# Patient Record
Sex: FEMALE | Race: WHITE | NOT HISPANIC OR LATINO | Employment: UNEMPLOYED | URBAN - METROPOLITAN AREA
[De-identification: names, ages, dates, MRNs, and addresses within clinical notes are randomized per-mention and may not be internally consistent; named-entity substitution may affect disease eponyms.]

---

## 2024-10-28 ENCOUNTER — APPOINTMENT (OUTPATIENT)
Dept: RADIOLOGY | Facility: HOSPITAL | Age: 69
DRG: 083 | End: 2024-10-28
Payer: MEDICARE

## 2024-10-28 ENCOUNTER — APPOINTMENT (OUTPATIENT)
Dept: RADIOLOGY | Facility: HOSPITAL | Age: 69
End: 2024-10-28
Payer: MEDICARE

## 2024-10-28 ENCOUNTER — HOSPITAL ENCOUNTER (INPATIENT)
Facility: HOSPITAL | Age: 69
DRG: 083 | End: 2024-10-28
Attending: EMERGENCY MEDICINE | Admitting: SURGERY
Payer: MEDICARE

## 2024-10-28 ENCOUNTER — HOSPITAL ENCOUNTER (EMERGENCY)
Facility: HOSPITAL | Age: 69
Discharge: OTHER INSTITUTION | End: 2024-10-28
Attending: STUDENT IN AN ORGANIZED HEALTH CARE EDUCATION/TRAINING PROGRAM
Payer: MEDICARE

## 2024-10-28 ENCOUNTER — CLINICAL SUPPORT (OUTPATIENT)
Dept: EMERGENCY MEDICINE | Facility: HOSPITAL | Age: 69
DRG: 083 | End: 2024-10-28
Payer: MEDICARE

## 2024-10-28 VITALS
SYSTOLIC BLOOD PRESSURE: 162 MMHG | HEIGHT: 66 IN | OXYGEN SATURATION: 97 % | WEIGHT: 160 LBS | RESPIRATION RATE: 17 BRPM | DIASTOLIC BLOOD PRESSURE: 80 MMHG | TEMPERATURE: 97.7 F | HEART RATE: 73 BPM | BODY MASS INDEX: 25.71 KG/M2

## 2024-10-28 DIAGNOSIS — S06.6X1A TRAUMATIC SUBARACHNOID HEMORRHAGE WITH LOSS OF CONSCIOUSNESS OF 30 MINUTES OR LESS, INITIAL ENCOUNTER (MULTI): ICD-10-CM

## 2024-10-28 DIAGNOSIS — F10.929 ALCOHOLIC INTOXICATION WITH COMPLICATION (CMS-HCC): ICD-10-CM

## 2024-10-28 DIAGNOSIS — R09.02 HYPOXIA: ICD-10-CM
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
## 2024-10-28 DIAGNOSIS — R45.1 AGITATION: ICD-10-CM

## 2024-10-28 DIAGNOSIS — S09.90XA CLOSED HEAD INJURY, INITIAL ENCOUNTER: ICD-10-CM

## 2024-10-28 DIAGNOSIS — S06.5XAA SDH (SUBDURAL HEMATOMA) (MULTI): ICD-10-CM

## 2024-10-28 DIAGNOSIS — I60.9 SAH (SUBARACHNOID HEMORRHAGE) (MULTI): Primary | ICD-10-CM

## 2024-10-28 LAB
ABO GROUP (TYPE) IN BLOOD: NORMAL
ALBUMIN SERPL BCP-MCNC: 4.2 G/DL (ref 3.4–5)
ALBUMIN SERPL BCP-MCNC: 4.4 G/DL (ref 3.4–5)
ALP SERPL-CCNC: 56 U/L (ref 33–136)
ALP SERPL-CCNC: 60 U/L (ref 33–136)
ALT SERPL W P-5'-P-CCNC: 24 U/L (ref 7–45)
ALT SERPL W P-5'-P-CCNC: 26 U/L (ref 7–45)
ANION GAP BLDV CALCULATED.4IONS-SCNC: 13 MMOL/L (ref 10–25)
ANION GAP BLDV CALCULATED.4IONS-SCNC: 15 MMOL/L (ref 10–25)
ANION GAP SERPL CALC-SCNC: 16 MMOL/L (ref 10–20)
ANION GAP SERPL CALCULATED.3IONS-SCNC: 14 MMOL/L (ref 10–20)
ANTIBODY SCREEN: NORMAL
AST SERPL W P-5'-P-CCNC: 36 U/L (ref 9–39)
AST SERPL W P-5'-P-CCNC: 36 U/L (ref 9–39)
BASE EXCESS BLDV CALC-SCNC: -5.3 MMOL/L (ref -2–3)
BASE EXCESS BLDV CALC-SCNC: -6.3 MMOL/L (ref -2–3)
BASOPHILS # BLD AUTO: 0.07 X10*3/UL (ref 0–0.1)
BASOPHILS # BLD AUTO: 0.08 X10*3/UL (ref 0–0.1)
BASOPHILS NFR BLD AUTO: 0.5 %
BASOPHILS NFR BLD AUTO: 0.8 %
BILIRUB SERPL-MCNC: 0.4 MG/DL (ref 0–1.2)
BILIRUB SERPL-MCNC: 0.4 MG/DL (ref 0–1.2)
BODY TEMPERATURE: 37 DEGREES CELSIUS
BODY TEMPERATURE: 37 DEGREES CELSIUS
BUN SERPL-MCNC: 13 MG/DL (ref 6–23)
BUN SERPL-MCNC: 14 MG/DL (ref 6–23)
CA-I BLDV-SCNC: 1.06 MMOL/L (ref 1.1–1.33)
CA-I BLDV-SCNC: 1.1 MMOL/L (ref 1.1–1.33)
CALCIUM SERPL-MCNC: 8.2 MG/DL (ref 8.6–10.6)
CALCIUM SERPL-MCNC: 8.8 MG/DL (ref 8.6–10.3)
CHLORIDE BLDV-SCNC: 103 MMOL/L (ref 98–107)
CHLORIDE BLDV-SCNC: 99 MMOL/L (ref 98–107)
CHLORIDE SERPL-SCNC: 102 MMOL/L (ref 98–107)
CHLORIDE SERPL-SCNC: 99 MMOL/L (ref 98–107)
CO2 SERPL-SCNC: 20 MMOL/L (ref 21–32)
CO2 SERPL-SCNC: 20 MMOL/L (ref 21–32)
CREAT SERPL-MCNC: 0.65 MG/DL (ref 0.5–1.05)
CREAT SERPL-MCNC: 0.79 MG/DL (ref 0.5–1.05)
EGFRCR SERPLBLD CKD-EPI 2021: 80 ML/MIN/1.73M*2
EGFRCR SERPLBLD CKD-EPI 2021: >90 ML/MIN/1.73M*2
EOSINOPHIL # BLD AUTO: 0.09 X10*3/UL (ref 0–0.4)
EOSINOPHIL # BLD AUTO: 0.12 X10*3/UL (ref 0–0.4)
EOSINOPHIL NFR BLD AUTO: 0.7 %
EOSINOPHIL NFR BLD AUTO: 1.3 %
ERYTHROCYTE [DISTWIDTH] IN BLOOD BY AUTOMATED COUNT: 12.4 % (ref 11.5–14.5)
ERYTHROCYTE [DISTWIDTH] IN BLOOD BY AUTOMATED COUNT: 12.7 % (ref 11.5–14.5)
ETHANOL SERPL-MCNC: 236 MG/DL
GLUCOSE BLDV-MCNC: 156 MG/DL (ref 74–99)
GLUCOSE BLDV-MCNC: 157 MG/DL (ref 74–99)
GLUCOSE SERPL-MCNC: 160 MG/DL (ref 74–99)
GLUCOSE SERPL-MCNC: 163 MG/DL (ref 74–99)
HCO3 BLDV-SCNC: 21 MMOL/L (ref 22–26)
HCO3 BLDV-SCNC: 22 MMOL/L (ref 22–26)
HCT VFR BLD AUTO: 35.3 % (ref 36–46)
HCT VFR BLD AUTO: 39.1 % (ref 36–46)
HCT VFR BLD EST: 36 % (ref 36–46)
HCT VFR BLD EST: 38 % (ref 36–46)
HGB BLD-MCNC: 11.9 G/DL (ref 12–16)
HGB BLD-MCNC: 12.6 G/DL (ref 12–16)
HGB BLDV-MCNC: 12.1 G/DL (ref 12–16)
HGB BLDV-MCNC: 12.7 G/DL (ref 12–16)
IMM GRANULOCYTES # BLD AUTO: 0.07 X10*3/UL (ref 0–0.5)
IMM GRANULOCYTES # BLD AUTO: 0.13 X10*3/UL (ref 0–0.5)
IMM GRANULOCYTES NFR BLD AUTO: 0.7 % (ref 0–0.9)
IMM GRANULOCYTES NFR BLD AUTO: 1 % (ref 0–0.9)
INHALED O2 CONCENTRATION: 28 %
INR PPP: 0.9 (ref 0.9–1.1)
INR PPP: 1 (ref 0.9–1.2)
LACTATE BLDV-SCNC: 2.1 MMOL/L (ref 0.4–2)
LACTATE BLDV-SCNC: 2.3 MMOL/L (ref 0.4–2)
LACTATE SERPL-SCNC: 2.2 MMOL/L (ref 0.4–2)
LACTATE SERPL-SCNC: 2.3 MMOL/L (ref 0.4–2)
LYMPHOCYTES # BLD AUTO: 2.42 X10*3/UL (ref 0.8–3)
LYMPHOCYTES # BLD AUTO: 4.32 X10*3/UL (ref 0.8–3)
LYMPHOCYTES NFR BLD AUTO: 18.5 %
LYMPHOCYTES NFR BLD AUTO: 45 %
MCH RBC QN AUTO: 28 PG (ref 26–34)
MCH RBC QN AUTO: 28.1 PG (ref 26–34)
MCHC RBC AUTO-ENTMCNC: 32.2 G/DL (ref 32–36)
MCHC RBC AUTO-ENTMCNC: 33.7 G/DL (ref 32–36)
MCV RBC AUTO: 83 FL (ref 80–100)
MCV RBC AUTO: 87 FL (ref 80–100)
MONOCYTES # BLD AUTO: 0.37 X10*3/UL (ref 0.05–0.8)
MONOCYTES # BLD AUTO: 0.46 X10*3/UL (ref 0.05–0.8)
MONOCYTES NFR BLD AUTO: 2.8 %
MONOCYTES NFR BLD AUTO: 4.8 %
NEUTROPHILS # BLD AUTO: 10.02 X10*3/UL (ref 1.6–5.5)
NEUTROPHILS # BLD AUTO: 4.55 X10*3/UL (ref 1.6–5.5)
NEUTROPHILS NFR BLD AUTO: 47.4 %
NEUTROPHILS NFR BLD AUTO: 76.5 %
NRBC BLD-RTO: 0 /100 WBCS (ref 0–0)
NRBC BLD-RTO: 0 /100 WBCS (ref 0–0)
OXYHGB MFR BLDV: 84.8 % (ref 45–75)
OXYHGB MFR BLDV: 91.4 % (ref 45–75)
PCO2 BLDV: 48 MM HG (ref 41–51)
PCO2 BLDV: 49 MM HG (ref 41–51)
PH BLDV: 7.25 PH (ref 7.33–7.43)
PH BLDV: 7.26 PH (ref 7.33–7.43)
PLATELET # BLD AUTO: 195 X10*3/UL (ref 150–450)
PLATELET # BLD AUTO: 202 X10*3/UL (ref 150–450)
PO2 BLDV: 58 MM HG (ref 35–45)
PO2 BLDV: 71 MM HG (ref 35–45)
POTASSIUM BLDV-SCNC: 3.3 MMOL/L (ref 3.5–5.3)
POTASSIUM BLDV-SCNC: 3.4 MMOL/L (ref 3.5–5.3)
POTASSIUM SERPL-SCNC: 3.3 MMOL/L (ref 3.5–5.3)
POTASSIUM SERPL-SCNC: 3.4 MMOL/L (ref 3.5–5.3)
PROT SERPL-MCNC: 7.1 G/DL (ref 6.4–8.2)
PROT SERPL-MCNC: 7.1 G/DL (ref 6.4–8.2)
PROTHROMBIN TIME: 10.3 SECONDS (ref 9.3–12.7)
PROTHROMBIN TIME: 10.5 SECONDS (ref 9.8–12.8)
RBC # BLD AUTO: 4.24 X10*6/UL (ref 4–5.2)
RBC # BLD AUTO: 4.5 X10*6/UL (ref 4–5.2)
RH FACTOR (ANTIGEN D): NORMAL
SAO2 % BLDV: 87 % (ref 45–75)
SAO2 % BLDV: 94 % (ref 45–75)
SODIUM BLDV-SCNC: 133 MMOL/L (ref 136–145)
SODIUM BLDV-SCNC: 134 MMOL/L (ref 136–145)
SODIUM SERPL-SCNC: 132 MMOL/L (ref 136–145)
SODIUM SERPL-SCNC: 133 MMOL/L (ref 136–145)
WBC # BLD AUTO: 13.1 X10*3/UL (ref 4.4–11.3)
WBC # BLD AUTO: 9.6 X10*3/UL (ref 4.4–11.3)

## 2024-10-28 PROCEDURE — 99221 1ST HOSP IP/OBS SF/LOW 40: CPT

## 2024-10-28 PROCEDURE — 85025 COMPLETE CBC W/AUTO DIFF WBC: CPT

## 2024-10-28 PROCEDURE — 86901 BLOOD TYPING SEROLOGIC RH(D): CPT

## 2024-10-28 PROCEDURE — 85027 COMPLETE CBC AUTOMATED: CPT

## 2024-10-28 PROCEDURE — 86850 RBC ANTIBODY SCREEN: CPT

## 2024-10-28 PROCEDURE — 80053 COMPREHEN METABOLIC PANEL: CPT

## 2024-10-28 PROCEDURE — 72125 CT NECK SPINE W/O DYE: CPT

## 2024-10-28 PROCEDURE — 96375 TX/PRO/DX INJ NEW DRUG ADDON: CPT

## 2024-10-28 PROCEDURE — 99291 CRITICAL CARE FIRST HOUR: CPT | Mod: 25

## 2024-10-28 PROCEDURE — 82805 BLOOD GASES W/O2 SATURATION: CPT

## 2024-10-28 PROCEDURE — 82077 ASSAY SPEC XCP UR&BREATH IA: CPT

## 2024-10-28 PROCEDURE — 72128 CT CHEST SPINE W/O DYE: CPT | Mod: 52

## 2024-10-28 PROCEDURE — 99221 1ST HOSP IP/OBS SF/LOW 40: CPT | Performed by: NEUROLOGICAL SURGERY

## 2024-10-28 PROCEDURE — 70496 CT ANGIOGRAPHY HEAD: CPT

## 2024-10-28 PROCEDURE — 71045 X-RAY EXAM CHEST 1 VIEW: CPT

## 2024-10-28 PROCEDURE — 84132 ASSAY OF SERUM POTASSIUM: CPT

## 2024-10-28 PROCEDURE — 84132 ASSAY OF SERUM POTASSIUM: CPT | Performed by: EMERGENCY MEDICINE

## 2024-10-28 PROCEDURE — 74177 CT ABD & PELVIS W/CONTRAST: CPT

## 2024-10-28 PROCEDURE — 2500000004 HC RX 250 GENERAL PHARMACY W/ HCPCS (ALT 636 FOR OP/ED)

## 2024-10-28 PROCEDURE — 96365 THER/PROPH/DIAG IV INF INIT: CPT | Mod: 59

## 2024-10-28 PROCEDURE — 70450 CT HEAD/BRAIN W/O DYE: CPT

## 2024-10-28 PROCEDURE — 70450 CT HEAD/BRAIN W/O DYE: CPT | Performed by: RADIOLOGY

## 2024-10-28 PROCEDURE — 83605 ASSAY OF LACTIC ACID: CPT

## 2024-10-28 PROCEDURE — 93005 ELECTROCARDIOGRAM TRACING: CPT

## 2024-10-28 PROCEDURE — 85347 COAGULATION TIME ACTIVATED: CPT

## 2024-10-28 PROCEDURE — 84100 ASSAY OF PHOSPHORUS: CPT

## 2024-10-28 PROCEDURE — 36415 COLL VENOUS BLD VENIPUNCTURE: CPT

## 2024-10-28 PROCEDURE — 2550000001 HC RX 255 CONTRASTS: Performed by: STUDENT IN AN ORGANIZED HEALTH CARE EDUCATION/TRAINING PROGRAM

## 2024-10-28 PROCEDURE — 82810 BLOOD GASES O2 SAT ONLY: CPT

## 2024-10-28 PROCEDURE — 96374 THER/PROPH/DIAG INJ IV PUSH: CPT

## 2024-10-28 PROCEDURE — 99285 EMERGENCY DEPT VISIT HI MDM: CPT

## 2024-10-28 PROCEDURE — 84295 ASSAY OF SERUM SODIUM: CPT

## 2024-10-28 PROCEDURE — 71045 X-RAY EXAM CHEST 1 VIEW: CPT | Performed by: RADIOLOGY

## 2024-10-28 PROCEDURE — 99285 EMERGENCY DEPT VISIT HI MDM: CPT | Mod: 25

## 2024-10-28 PROCEDURE — 36600 WITHDRAWAL OF ARTERIAL BLOOD: CPT

## 2024-10-28 PROCEDURE — 2550000001 HC RX 255 CONTRASTS: Performed by: EMERGENCY MEDICINE

## 2024-10-28 PROCEDURE — 2020000001 HC ICU ROOM DAILY

## 2024-10-28 PROCEDURE — 85610 PROTHROMBIN TIME: CPT

## 2024-10-28 PROCEDURE — 84075 ASSAY ALKALINE PHOSPHATASE: CPT

## 2024-10-28 PROCEDURE — 72170 X-RAY EXAM OF PELVIS: CPT | Performed by: RADIOLOGY

## 2024-10-28 PROCEDURE — G0390 TRAUMA RESPONS W/HOSP CRITI: HCPCS

## 2024-10-28 PROCEDURE — 96375 TX/PRO/DX INJ NEW DRUG ADDON: CPT | Mod: 59

## 2024-10-28 PROCEDURE — 72128 CT CHEST SPINE W/O DYE: CPT | Mod: REDUCED SERVICES | Performed by: RADIOLOGY

## 2024-10-28 PROCEDURE — 71275 CT ANGIOGRAPHY CHEST: CPT

## 2024-10-28 PROCEDURE — 70498 CT ANGIOGRAPHY NECK: CPT | Performed by: RADIOLOGY

## 2024-10-28 PROCEDURE — 70496 CT ANGIOGRAPHY HEAD: CPT | Performed by: RADIOLOGY

## 2024-10-28 PROCEDURE — 72170 X-RAY EXAM OF PELVIS: CPT

## 2024-10-28 PROCEDURE — 83735 ASSAY OF MAGNESIUM: CPT

## 2024-10-28 PROCEDURE — 70498 CT ANGIOGRAPHY NECK: CPT

## 2024-10-28 PROCEDURE — 2500000004 HC RX 250 GENERAL PHARMACY W/ HCPCS (ALT 636 FOR OP/ED): Performed by: SURGERY

## 2024-10-28 PROCEDURE — 85730 THROMBOPLASTIN TIME PARTIAL: CPT

## 2024-10-28 PROCEDURE — 82330 ASSAY OF CALCIUM: CPT | Performed by: EMERGENCY MEDICINE

## 2024-10-28 PROCEDURE — 85384 FIBRINOGEN ACTIVITY: CPT

## 2024-10-28 PROCEDURE — 99285 EMERGENCY DEPT VISIT HI MDM: CPT | Performed by: EMERGENCY MEDICINE

## 2024-10-28 PROCEDURE — 74177 CT ABD & PELVIS W/CONTRAST: CPT | Performed by: RADIOLOGY

## 2024-10-28 PROCEDURE — 71275 CT ANGIOGRAPHY CHEST: CPT | Performed by: RADIOLOGY

## 2024-10-28 PROCEDURE — 36415 COLL VENOUS BLD VENIPUNCTURE: CPT | Performed by: EMERGENCY MEDICINE

## 2024-10-28 RX ORDER — HYDRALAZINE HYDROCHLORIDE 20 MG/ML
10 INJECTION INTRAMUSCULAR; INTRAVENOUS ONCE
Status: COMPLETED | OUTPATIENT
Start: 2024-10-29 | End: 2024-10-29

## 2024-10-28 RX ORDER — LEVETIRACETAM 10 MG/ML
1000 INJECTION INTRAVASCULAR ONCE
Status: COMPLETED | OUTPATIENT
Start: 2024-10-28 | End: 2024-10-28

## 2024-10-28 RX ORDER — DROPERIDOL 2.5 MG/ML
1.25 INJECTION, SOLUTION INTRAMUSCULAR; INTRAVENOUS ONCE
Status: COMPLETED | OUTPATIENT
Start: 2024-10-28 | End: 2024-10-28

## 2024-10-28 RX ORDER — HYDRALAZINE HYDROCHLORIDE 20 MG/ML
10 INJECTION INTRAMUSCULAR; INTRAVENOUS ONCE
Status: COMPLETED | OUTPATIENT
Start: 2024-10-28 | End: 2024-10-28

## 2024-10-28 RX ORDER — ONDANSETRON HYDROCHLORIDE 2 MG/ML
INJECTION, SOLUTION INTRAVENOUS
Status: DISPENSED
Start: 2024-10-28 | End: 2024-10-29

## 2024-10-28 RX ORDER — ONDANSETRON HYDROCHLORIDE 2 MG/ML
4 INJECTION, SOLUTION INTRAVENOUS ONCE
Status: COMPLETED | OUTPATIENT
Start: 2024-10-28 | End: 2024-10-28

## 2024-10-28 RX ORDER — HYDRALAZINE HYDROCHLORIDE 20 MG/ML
INJECTION INTRAMUSCULAR; INTRAVENOUS
Status: COMPLETED
Start: 2024-10-28 | End: 2024-10-28

## 2024-10-28 RX ORDER — HYDRALAZINE HYDROCHLORIDE 20 MG/ML
5 INJECTION INTRAMUSCULAR; INTRAVENOUS EVERY 4 HOURS PRN
Status: DISCONTINUED | OUTPATIENT
Start: 2024-10-28 | End: 2024-10-28

## 2024-10-28 RX ORDER — ONDANSETRON HYDROCHLORIDE 2 MG/ML
INJECTION, SOLUTION INTRAVENOUS CODE/TRAUMA/SEDATION MEDICATION
Status: COMPLETED | OUTPATIENT
Start: 2024-10-28 | End: 2024-10-28

## 2024-10-28 RX ADMIN — HYDRALAZINE HYDROCHLORIDE 10 MG: 20 INJECTION INTRAMUSCULAR; INTRAVENOUS at 22:50

## 2024-10-28 RX ADMIN — IOHEXOL 90 ML: 350 INJECTION, SOLUTION INTRAVENOUS at 23:19

## 2024-10-28 RX ADMIN — HYDRALAZINE HYDROCHLORIDE 5 MG: 20 INJECTION INTRAMUSCULAR; INTRAVENOUS at 23:58

## 2024-10-28 RX ADMIN — DROPERIDOL 1.25 MG: 2.5 INJECTION, SOLUTION INTRAMUSCULAR; INTRAVENOUS at 22:42

## 2024-10-28 RX ADMIN — ONDANSETRON 4 MG: 2 INJECTION, SOLUTION INTRAMUSCULAR; INTRAVENOUS at 22:28

## 2024-10-28 ASSESSMENT — ENCOUNTER SYMPTOMS
WHEEZING: 0
AGITATION: 1
CONFUSION: 1
FLANK PAIN: 1
FEVER: 0
WOUND: 1
CHILLS: 0
SEIZURES: 0
NECK PAIN: 0
VOMITING: 1
EYE DISCHARGE: 0
SPEECH DIFFICULTY: 0
EYE PAIN: 0

## 2024-10-28 ASSESSMENT — LIFESTYLE VARIABLES
TOTAL SCORE: 0
HAVE YOU EVER FELT YOU SHOULD CUT DOWN ON YOUR DRINKING: NO
EVER FELT BAD OR GUILTY ABOUT YOUR DRINKING: NO
EVER HAD A DRINK FIRST THING IN THE MORNING TO STEADY YOUR NERVES TO GET RID OF A HANGOVER: NO
HAVE PEOPLE ANNOYED YOU BY CRITICIZING YOUR DRINKING: NO

## 2024-10-28 ASSESSMENT — PAIN SCALES - GENERAL
PAINLEVEL_OUTOF10: 9
PAINLEVEL_OUTOF10: 9

## 2024-10-28 ASSESSMENT — PAIN SCALES - WONG BAKER: WONGBAKER_NUMERICALRESPONSE: NO HURT

## 2024-10-28 ASSESSMENT — PAIN - FUNCTIONAL ASSESSMENT
PAIN_FUNCTIONAL_ASSESSMENT: 0-10
PAIN_FUNCTIONAL_ASSESSMENT: WONG-BAKER FACES

## 2024-10-29 ENCOUNTER — APPOINTMENT (OUTPATIENT)
Dept: RADIOLOGY | Facility: HOSPITAL | Age: 69
DRG: 083 | End: 2024-10-29
Payer: MEDICARE

## 2024-10-29 LAB
ABO GROUP (TYPE) IN BLOOD: NORMAL
ABO GROUP (TYPE) IN BLOOD: NORMAL
ALBUMIN SERPL BCP-MCNC: 4.4 G/DL (ref 3.4–5)
ALBUMIN SERPL BCP-MCNC: 4.7 G/DL (ref 3.4–5)
ANION GAP SERPL CALC-SCNC: 18 MMOL/L (ref 10–20)
ANION GAP SERPL CALC-SCNC: 18 MMOL/L (ref 10–20)
ANION GAP SERPL CALC-SCNC: 19 MMOL/L (ref 10–20)
ANTIBODY SCREEN: NORMAL
APTT PPP: 27 SECONDS (ref 27–38)
ATRIAL RATE: 90 BPM
BUN SERPL-MCNC: 11 MG/DL (ref 6–23)
BUN SERPL-MCNC: 12 MG/DL (ref 6–23)
BUN SERPL-MCNC: 8 MG/DL (ref 6–23)
CA-I BLD-SCNC: 1.07 MMOL/L (ref 1.1–1.33)
CALCIUM SERPL-MCNC: 8.2 MG/DL (ref 8.6–10.6)
CALCIUM SERPL-MCNC: 8.4 MG/DL (ref 8.6–10.6)
CALCIUM SERPL-MCNC: 8.7 MG/DL (ref 8.6–10.6)
CFT FORM KAOLIN IND BLD RES TEG: 1.5 MIN (ref 0.8–2.1)
CFT FORM KAOLIN IND BLD RES TEG: 1.6 MIN (ref 0.8–2.1)
CHLORIDE SERPL-SCNC: 97 MMOL/L (ref 98–107)
CHLORIDE SERPL-SCNC: 98 MMOL/L (ref 98–107)
CHLORIDE SERPL-SCNC: 99 MMOL/L (ref 98–107)
CLOT ANGLE.KAOLIN INDUCED BLD RES TEG: 70.1 DEG (ref 63–78)
CLOT ANGLE.KAOLIN INDUCED BLD RES TEG: 70.4 DEG (ref 63–78)
CLOT INIT KAO IND P HEP NEUT BLD RES TEG: 3.7 MIN (ref 4.3–8.3)
CLOT INIT KAO IND P HEP NEUT BLD RES TEG: 3.7 MIN (ref 4.6–9.1)
CLOT INIT KAO IND P HEP NEUT BLD RES TEG: 6.1 MIN (ref 4.3–8.3)
CLOT INIT KAO IND P HEP NEUT BLD RES TEG: 6.4 MIN (ref 4.6–9.1)
CO2 SERPL-SCNC: 19 MMOL/L (ref 21–32)
CO2 SERPL-SCNC: 21 MMOL/L (ref 21–32)
CO2 SERPL-SCNC: 22 MMOL/L (ref 21–32)
CREAT SERPL-MCNC: 0.52 MG/DL (ref 0.5–1.05)
CREAT SERPL-MCNC: 0.54 MG/DL (ref 0.5–1.05)
CREAT SERPL-MCNC: 0.63 MG/DL (ref 0.5–1.05)
EGFRCR SERPLBLD CKD-EPI 2021: >90 ML/MIN/1.73M*2
ERYTHROCYTE [DISTWIDTH] IN BLOOD BY AUTOMATED COUNT: 12.3 % (ref 11.5–14.5)
ERYTHROCYTE [DISTWIDTH] IN BLOOD BY AUTOMATED COUNT: 12.6 % (ref 11.5–14.5)
ETHANOL SERPL-MCNC: 174 MG/DL
FIBRINOGEN BLD CALC-MCNC: 266 MG/DL (ref 278–581)
FIBRINOGEN BLD CALC-MCNC: 296 MG/DL (ref 278–581)
FIBRINOGEN PPP-MCNC: 266 MG/DL (ref 200–400)
GLUCOSE BLD MANUAL STRIP-MCNC: 134 MG/DL (ref 74–99)
GLUCOSE BLD MANUAL STRIP-MCNC: 138 MG/DL (ref 74–99)
GLUCOSE BLD MANUAL STRIP-MCNC: 145 MG/DL (ref 74–99)
GLUCOSE BLD MANUAL STRIP-MCNC: 158 MG/DL (ref 74–99)
GLUCOSE SERPL-MCNC: 132 MG/DL (ref 74–99)
GLUCOSE SERPL-MCNC: 136 MG/DL (ref 74–99)
GLUCOSE SERPL-MCNC: 159 MG/DL (ref 74–99)
HCT VFR BLD AUTO: 37.2 % (ref 36–46)
HCT VFR BLD AUTO: 39.9 % (ref 36–46)
HGB BLD-MCNC: 12.4 G/DL (ref 12–16)
HGB BLD-MCNC: 12.9 G/DL (ref 12–16)
LACTATE SERPL-SCNC: 1.2 MMOL/L (ref 0.4–2)
LACTATE SERPL-SCNC: 2.3 MMOL/L (ref 0.4–2)
MA KAOLIN BLD RES TEG: 57.9 MM (ref 52–69)
MA KAOLIN BLD RES TEG: 58.5 MM (ref 52–69)
MA KAOLIN+TF BLD RES TEG: 56.9 MM (ref 52–70)
MA KAOLIN+TF BLD RES TEG: 59 MM (ref 52–70)
MA TF IND+IIB-IIIA INH BLD RES TEG: 14.6 MM (ref 15–32)
MA TF IND+IIB-IIIA INH BLD RES TEG: 16.2 MM (ref 15–32)
MAGNESIUM SERPL-MCNC: 1.81 MG/DL (ref 1.6–2.4)
MCH RBC QN AUTO: 27.7 PG (ref 26–34)
MCH RBC QN AUTO: 28.1 PG (ref 26–34)
MCHC RBC AUTO-ENTMCNC: 32.3 G/DL (ref 32–36)
MCHC RBC AUTO-ENTMCNC: 33.3 G/DL (ref 32–36)
MCV RBC AUTO: 84 FL (ref 80–100)
MCV RBC AUTO: 86 FL (ref 80–100)
NRBC BLD-RTO: 0 /100 WBCS (ref 0–0)
NRBC BLD-RTO: 0 /100 WBCS (ref 0–0)
P AXIS: 55 DEGREES
P OFFSET: 161 MS
P ONSET: 110 MS
PHOSPHATE SERPL-MCNC: 3.6 MG/DL (ref 2.5–4.9)
PHOSPHATE SERPL-MCNC: 3.7 MG/DL (ref 2.5–4.9)
PHOSPHATE SERPL-MCNC: 3.9 MG/DL (ref 2.5–4.9)
PLATELET # BLD AUTO: 189 X10*3/UL (ref 150–450)
PLATELET # BLD AUTO: 229 X10*3/UL (ref 150–450)
POTASSIUM SERPL-SCNC: 3.4 MMOL/L (ref 3.5–5.3)
POTASSIUM SERPL-SCNC: 3.6 MMOL/L (ref 3.5–5.3)
POTASSIUM SERPL-SCNC: 4.7 MMOL/L (ref 3.5–5.3)
PR INTERVAL: 200 MS
Q ONSET: 210 MS
QRS COUNT: 15 BEATS
QRS DURATION: 94 MS
QT INTERVAL: 402 MS
QTC CALCULATION(BAZETT): 491 MS
QTC FREDERICIA: 460 MS
R AXIS: 92 DEGREES
RBC # BLD AUTO: 4.42 X10*6/UL (ref 4–5.2)
RBC # BLD AUTO: 4.66 X10*6/UL (ref 4–5.2)
RH FACTOR (ANTIGEN D): NORMAL
RH FACTOR (ANTIGEN D): NORMAL
SODIUM SERPL-SCNC: 131 MMOL/L (ref 136–145)
SODIUM SERPL-SCNC: 134 MMOL/L (ref 136–145)
SODIUM SERPL-SCNC: 134 MMOL/L (ref 136–145)
T AXIS: 29 DEGREES
T OFFSET: 411 MS
VENTRICULAR RATE: 90 BPM
WBC # BLD AUTO: 16.1 X10*3/UL (ref 4.4–11.3)
WBC # BLD AUTO: 16.1 X10*3/UL (ref 4.4–11.3)

## 2024-10-29 PROCEDURE — 36415 COLL VENOUS BLD VENIPUNCTURE: CPT | Performed by: STUDENT IN AN ORGANIZED HEALTH CARE EDUCATION/TRAINING PROGRAM

## 2024-10-29 PROCEDURE — 97166 OT EVAL MOD COMPLEX 45 MIN: CPT | Mod: GO

## 2024-10-29 PROCEDURE — 2500000002 HC RX 250 W HCPCS SELF ADMINISTERED DRUGS (ALT 637 FOR MEDICARE OP, ALT 636 FOR OP/ED): Performed by: NURSE PRACTITIONER

## 2024-10-29 PROCEDURE — 83605 ASSAY OF LACTIC ACID: CPT | Performed by: STUDENT IN AN ORGANIZED HEALTH CARE EDUCATION/TRAINING PROGRAM

## 2024-10-29 PROCEDURE — 97162 PT EVAL MOD COMPLEX 30 MIN: CPT | Mod: GP | Performed by: PHYSICAL THERAPIST

## 2024-10-29 PROCEDURE — 99222 1ST HOSP IP/OBS MODERATE 55: CPT | Performed by: PHYSICIAN ASSISTANT

## 2024-10-29 PROCEDURE — 2500000001 HC RX 250 WO HCPCS SELF ADMINISTERED DRUGS (ALT 637 FOR MEDICARE OP): Performed by: NURSE PRACTITIONER

## 2024-10-29 PROCEDURE — 36600 WITHDRAWAL OF ARTERIAL BLOOD: CPT | Performed by: SURGERY

## 2024-10-29 PROCEDURE — 2020000001 HC ICU ROOM DAILY

## 2024-10-29 PROCEDURE — 2500000001 HC RX 250 WO HCPCS SELF ADMINISTERED DRUGS (ALT 637 FOR MEDICARE OP)

## 2024-10-29 PROCEDURE — 76377 3D RENDER W/INTRP POSTPROCES: CPT

## 2024-10-29 PROCEDURE — 82947 ASSAY GLUCOSE BLOOD QUANT: CPT

## 2024-10-29 PROCEDURE — 36415 COLL VENOUS BLD VENIPUNCTURE: CPT

## 2024-10-29 PROCEDURE — 85384 FIBRINOGEN ACTIVITY: CPT | Performed by: SURGERY

## 2024-10-29 PROCEDURE — 2500000004 HC RX 250 GENERAL PHARMACY W/ HCPCS (ALT 636 FOR OP/ED): Mod: JZ | Performed by: NURSE PRACTITIONER

## 2024-10-29 PROCEDURE — 80069 RENAL FUNCTION PANEL: CPT | Performed by: STUDENT IN AN ORGANIZED HEALTH CARE EDUCATION/TRAINING PROGRAM

## 2024-10-29 PROCEDURE — 2500000004 HC RX 250 GENERAL PHARMACY W/ HCPCS (ALT 636 FOR OP/ED)

## 2024-10-29 PROCEDURE — 70450 CT HEAD/BRAIN W/O DYE: CPT | Performed by: RADIOLOGY

## 2024-10-29 PROCEDURE — 70450 CT HEAD/BRAIN W/O DYE: CPT

## 2024-10-29 PROCEDURE — 99233 SBSQ HOSP IP/OBS HIGH 50: CPT | Performed by: STUDENT IN AN ORGANIZED HEALTH CARE EDUCATION/TRAINING PROGRAM

## 2024-10-29 PROCEDURE — 2500000002 HC RX 250 W HCPCS SELF ADMINISTERED DRUGS (ALT 637 FOR MEDICARE OP, ALT 636 FOR OP/ED)

## 2024-10-29 PROCEDURE — 84100 ASSAY OF PHOSPHORUS: CPT | Performed by: STUDENT IN AN ORGANIZED HEALTH CARE EDUCATION/TRAINING PROGRAM

## 2024-10-29 PROCEDURE — 80069 RENAL FUNCTION PANEL: CPT

## 2024-10-29 PROCEDURE — 84100 ASSAY OF PHOSPHORUS: CPT

## 2024-10-29 PROCEDURE — 71045 X-RAY EXAM CHEST 1 VIEW: CPT | Performed by: RADIOLOGY

## 2024-10-29 PROCEDURE — 85027 COMPLETE CBC AUTOMATED: CPT

## 2024-10-29 PROCEDURE — 82330 ASSAY OF CALCIUM: CPT

## 2024-10-29 PROCEDURE — 70486 CT MAXILLOFACIAL W/O DYE: CPT

## 2024-10-29 PROCEDURE — 70486 CT MAXILLOFACIAL W/O DYE: CPT | Performed by: RADIOLOGY

## 2024-10-29 RX ORDER — FOLIC ACID 0.4 MG
0.4 TABLET ORAL DAILY
Status: DISCONTINUED | OUTPATIENT
Start: 2024-10-29 | End: 2024-11-04 | Stop reason: HOSPADM

## 2024-10-29 RX ORDER — PANTOPRAZOLE SODIUM 40 MG/1
40 TABLET, DELAYED RELEASE ORAL
Status: DISCONTINUED | OUTPATIENT
Start: 2024-10-29 | End: 2024-11-04 | Stop reason: HOSPADM

## 2024-10-29 RX ORDER — SERTRALINE HYDROCHLORIDE 50 MG/1
75 TABLET, FILM COATED ORAL DAILY
COMMUNITY
Start: 2024-10-21

## 2024-10-29 RX ORDER — CALCIUM CARBONATE 200(500)MG
1 TABLET,CHEWABLE ORAL DAILY PRN
COMMUNITY

## 2024-10-29 RX ORDER — ROSUVASTATIN CALCIUM 20 MG/1
40 TABLET, COATED ORAL NIGHTLY
Status: DISCONTINUED | OUTPATIENT
Start: 2024-10-29 | End: 2024-11-04 | Stop reason: HOSPADM

## 2024-10-29 RX ORDER — ALBUTEROL SULFATE 90 UG/1
2 INHALANT RESPIRATORY (INHALATION) EVERY 4 HOURS PRN
COMMUNITY
Start: 2024-01-22

## 2024-10-29 RX ORDER — ASPIRIN 81 MG/1
81 TABLET ORAL
COMMUNITY

## 2024-10-29 RX ORDER — ACETAMINOPHEN 325 MG/1
975 TABLET ORAL EVERY 8 HOURS PRN
Status: DISCONTINUED | OUTPATIENT
Start: 2024-10-29 | End: 2024-10-30

## 2024-10-29 RX ORDER — LEVOTHYROXINE SODIUM 100 UG/1
100 TABLET ORAL DAILY
COMMUNITY

## 2024-10-29 RX ORDER — IBANDRONATE SODIUM 150 MG/1
150 TABLET, FILM COATED ORAL
COMMUNITY
Start: 2019-02-14

## 2024-10-29 RX ORDER — AZELASTINE HYDROCHLORIDE 0.5 MG/ML
1 SOLUTION/ DROPS OPHTHALMIC DAILY
COMMUNITY
Start: 2024-05-03

## 2024-10-29 RX ORDER — HYDRALAZINE HYDROCHLORIDE 20 MG/ML
10 INJECTION INTRAMUSCULAR; INTRAVENOUS EVERY 4 HOURS PRN
Status: DISCONTINUED | OUTPATIENT
Start: 2024-10-29 | End: 2024-10-30

## 2024-10-29 RX ORDER — ALBUTEROL SULFATE 90 UG/1
2 INHALANT RESPIRATORY (INHALATION) EVERY 4 HOURS PRN
Status: DISCONTINUED | OUTPATIENT
Start: 2024-10-29 | End: 2024-11-04 | Stop reason: HOSPADM

## 2024-10-29 RX ORDER — CARVEDILOL 6.25 MG/1
6.25 TABLET ORAL
COMMUNITY
Start: 2024-06-24 | End: 2024-12-21

## 2024-10-29 RX ORDER — POTASSIUM CHLORIDE 14.9 MG/ML
20 INJECTION INTRAVENOUS
Status: COMPLETED | OUTPATIENT
Start: 2024-10-29 | End: 2024-10-29

## 2024-10-29 RX ORDER — GLUCOSAM/CHONDRO/HERB 149/HYAL 750-100 MG
1 TABLET ORAL DAILY
COMMUNITY

## 2024-10-29 RX ORDER — LANOLIN ALCOHOL/MO/W.PET/CERES
100 CREAM (GRAM) TOPICAL DAILY
Status: DISCONTINUED | OUTPATIENT
Start: 2024-10-29 | End: 2024-11-04 | Stop reason: HOSPADM

## 2024-10-29 RX ORDER — CLOPIDOGREL BISULFATE 75 MG/1
75 TABLET ORAL
COMMUNITY
Start: 2024-06-24 | End: 2024-11-04 | Stop reason: HOSPADM

## 2024-10-29 RX ORDER — BENZONATATE 100 MG/1
100 CAPSULE ORAL 3 TIMES DAILY PRN
COMMUNITY
Start: 2024-01-04

## 2024-10-29 RX ORDER — CARVEDILOL 12.5 MG/1
12.5 TABLET ORAL 2 TIMES DAILY
Status: DISCONTINUED | OUTPATIENT
Start: 2024-10-29 | End: 2024-11-04 | Stop reason: HOSPADM

## 2024-10-29 RX ORDER — MULTIVITAMIN
1 TABLET ORAL
COMMUNITY

## 2024-10-29 RX ORDER — ROSUVASTATIN CALCIUM 40 MG/1
1 TABLET, COATED ORAL
COMMUNITY
Start: 2024-10-21

## 2024-10-29 RX ORDER — OMEPRAZOLE 20 MG/1
40 CAPSULE, DELAYED RELEASE ORAL EVERY MORNING
COMMUNITY
Start: 2023-05-20

## 2024-10-29 RX ORDER — SODIUM CHLORIDE 9 MG/ML
75 INJECTION, SOLUTION INTRAVENOUS CONTINUOUS
Status: DISCONTINUED | OUTPATIENT
Start: 2024-10-29 | End: 2024-10-30

## 2024-10-29 RX ORDER — LEVOTHYROXINE SODIUM 100 UG/1
100 TABLET ORAL DAILY
Status: DISCONTINUED | OUTPATIENT
Start: 2024-10-29 | End: 2024-11-04 | Stop reason: HOSPADM

## 2024-10-29 RX ORDER — SERTRALINE HYDROCHLORIDE 50 MG/1
75 TABLET, FILM COATED ORAL DAILY
Status: DISCONTINUED | OUTPATIENT
Start: 2024-10-29 | End: 2024-11-04 | Stop reason: HOSPADM

## 2024-10-29 RX ORDER — MAGNESIUM SULFATE HEPTAHYDRATE 40 MG/ML
2 INJECTION, SOLUTION INTRAVENOUS ONCE
Status: COMPLETED | OUTPATIENT
Start: 2024-10-29 | End: 2024-10-29

## 2024-10-29 RX ORDER — CALCIUM GLUCONATE 20 MG/ML
1 INJECTION, SOLUTION INTRAVENOUS EVERY 4 HOURS
Status: COMPLETED | OUTPATIENT
Start: 2024-10-29 | End: 2024-10-29

## 2024-10-29 RX ORDER — LEVETIRACETAM 5 MG/ML
500 INJECTION INTRAVASCULAR EVERY 12 HOURS
Status: DISCONTINUED | OUTPATIENT
Start: 2024-10-29 | End: 2024-11-04 | Stop reason: HOSPADM

## 2024-10-29 RX ADMIN — LEVETIRACETAM 500 MG: 5 INJECTION INTRAVENOUS at 20:08

## 2024-10-29 RX ADMIN — LEVOTHYROXINE SODIUM 100 MCG: 0.1 TABLET ORAL at 08:40

## 2024-10-29 RX ADMIN — HYDRALAZINE HYDROCHLORIDE 10 MG: 20 INJECTION INTRAMUSCULAR; INTRAVENOUS at 06:15

## 2024-10-29 RX ADMIN — SODIUM CHLORIDE 75 ML/HR: 9 INJECTION, SOLUTION INTRAVENOUS at 07:46

## 2024-10-29 RX ADMIN — HYDRALAZINE HYDROCHLORIDE 10 MG: 20 INJECTION INTRAMUSCULAR; INTRAVENOUS at 00:16

## 2024-10-29 RX ADMIN — AMPICILLIN SODIUM AND SULBACTAM SODIUM 3 G: 2; 1 INJECTION, POWDER, FOR SOLUTION INTRAMUSCULAR; INTRAVENOUS at 20:09

## 2024-10-29 RX ADMIN — POTASSIUM CHLORIDE 20 MEQ: 200 INJECTION, SOLUTION INTRAVENOUS at 06:01

## 2024-10-29 RX ADMIN — ROSUVASTATIN CALCIUM 40 MG: 20 TABLET, FILM COATED ORAL at 20:09

## 2024-10-29 RX ADMIN — LEVETIRACETAM 500 MG: 5 INJECTION INTRAVENOUS at 08:39

## 2024-10-29 RX ADMIN — POTASSIUM CHLORIDE 20 MEQ: 200 INJECTION, SOLUTION INTRAVENOUS at 08:39

## 2024-10-29 RX ADMIN — CARVEDILOL 12.5 MG: 12.5 TABLET, FILM COATED ORAL at 20:09

## 2024-10-29 RX ADMIN — AMPICILLIN SODIUM AND SULBACTAM SODIUM 3 G: 2; 1 INJECTION, POWDER, FOR SOLUTION INTRAMUSCULAR; INTRAVENOUS at 13:45

## 2024-10-29 RX ADMIN — SERTRALINE 75 MG: 50 TABLET, FILM COATED ORAL at 08:40

## 2024-10-29 RX ADMIN — HYDRALAZINE HYDROCHLORIDE 10 MG: 20 INJECTION INTRAMUSCULAR; INTRAVENOUS at 16:39

## 2024-10-29 RX ADMIN — MAGNESIUM SULFATE HEPTAHYDRATE 2 G: 40 INJECTION, SOLUTION INTRAVENOUS at 06:00

## 2024-10-29 RX ADMIN — ACETAMINOPHEN 975 MG: 325 TABLET, FILM COATED ORAL at 17:34

## 2024-10-29 RX ADMIN — CARVEDILOL 12.5 MG: 12.5 TABLET, FILM COATED ORAL at 08:40

## 2024-10-29 RX ADMIN — CALCIUM GLUCONATE 1 G: 20 INJECTION, SOLUTION INTRAVENOUS at 07:46

## 2024-10-29 RX ADMIN — AMPICILLIN SODIUM AND SULBACTAM SODIUM 3 G: 2; 1 INJECTION, POWDER, FOR SOLUTION INTRAMUSCULAR; INTRAVENOUS at 08:39

## 2024-10-29 ASSESSMENT — PAIN SCALES - GENERAL
PAINLEVEL_OUTOF10: 3
PAINLEVEL_OUTOF10: 10 - WORST POSSIBLE PAIN
PAINLEVEL_OUTOF10: 8
PAINLEVEL_OUTOF10: 8
PAINLEVEL_OUTOF10: 3
PAINLEVEL_OUTOF10: 0 - NO PAIN

## 2024-10-29 ASSESSMENT — COGNITIVE AND FUNCTIONAL STATUS - GENERAL
CLIMB 3 TO 5 STEPS WITH RAILING: TOTAL
PERSONAL GROOMING: A LITTLE
DRESSING REGULAR LOWER BODY CLOTHING: TOTAL
WALKING IN HOSPITAL ROOM: TOTAL
DAILY ACTIVITIY SCORE: 10
DRESSING REGULAR UPPER BODY CLOTHING: TOTAL
MOBILITY SCORE: 8
MOVING FROM LYING ON BACK TO SITTING ON SIDE OF FLAT BED WITH BEDRAILS: A LITTLE
EATING MEALS: A LITTLE
TOILETING: TOTAL
HELP NEEDED FOR BATHING: TOTAL
TURNING FROM BACK TO SIDE WHILE IN FLAT BAD: TOTAL
STANDING UP FROM CHAIR USING ARMS: TOTAL
MOVING TO AND FROM BED TO CHAIR: TOTAL

## 2024-10-29 ASSESSMENT — PAIN - FUNCTIONAL ASSESSMENT
PAIN_FUNCTIONAL_ASSESSMENT: CPOT (CRITICAL CARE PAIN OBSERVATION TOOL)
PAIN_FUNCTIONAL_ASSESSMENT: 0-10
PAIN_FUNCTIONAL_ASSESSMENT: CPOT (CRITICAL CARE PAIN OBSERVATION TOOL)

## 2024-10-29 ASSESSMENT — ACTIVITIES OF DAILY LIVING (ADL)
ADL_ASSISTANCE: INDEPENDENT
ADL_ASSISTANCE: INDEPENDENT
BATHING_ASSISTANCE: MAXIMAL

## 2024-10-29 NOTE — PROGRESS NOTES
"[facility]  TRAUMA SERVICE - PROGRESS NOTE    Patient Name: Pia Nguyen  MRN: [id number]  Admit Date: 1028  : 1955  AGE: 69 y.o.   GENDER: female  ==============================================================================  MECHANISM OF INJURY:   Fall down 5 stairs  LOC (yes/no?): yes, (+) ETOH  Anticoagulant / Anti-platelet Rx? (for what dx?): DAPT for PVD  Referring Facility Name (N/A for scene EMR run): [facility]     INJURIES:   L frontal SAH  Thin L SDH  IPH near L silvian fissure  pneumocephalus     OTHER MEDICAL PROBLEMS:  PVD  HTN  HLD  GERD  COPD/asthma  hypothyroidism     INCIDENTAL FINDINGS:  Bilateral carotid stenosis     PROCEDURES:  none    ==============================================================================  TODAY'S ASSESSMENT AND PLAN OF CARE:  Pia Nguyen is a 69 y.o. female admitted to the ICU s/p fall with SAH, SDH, and IPH with need for q1hr neurochecks. Repeat CT head this AM stable from prior. CT face with concern for ethmoid sinus fracture.    - Neurosurgery consulted   - plan for repeat stability scan tomorrow AM  - continue keppra  - unasyn for facial fracture  - plastics surgery consult for facial fracture  - wean oxygen as able    Smooth Billingsley MD  PGY4 General Surgery  Trauma Surgery [id number]    ==============================================================================  CHIEF COMPLAINT / OVERNIGHT EVENTS:   Admit to TICU overnight with intracranial hemorrhage. Patient had emesis enroute and so had repeat scan on arrival which showed worsening hemorrhage. 3rd CT scan around 5am showed stability.    MEDICAL HISTORY / ROS:  Admission history and ROS reviewed. Pertinent changes as follows:  N/A    PHYSICAL EXAM:  Heart Rate:  [60-92]   Temp:  [36 °C (96.8 °F)-36.9 °C (98.4 °F)]   Resp:  [12-36]   BP: (116-180)/()   Height:  [167.6 cm (5' 6\")]   Weight:  [72.6 kg (160 lb)]   SpO2:  [83 %-100 %]   Physical " Exam  Constitutional:       General: She is not in acute distress.  HENT:      Head: Normocephalic and atraumatic.   Eyes:      Extraocular Movements: Extraocular movements intact.   Neck:      Comments: C-collar in place  Cardiovascular:      Rate and Rhythm: Normal rate and regular rhythm.   Pulmonary:      Effort: Pulmonary effort is normal. No respiratory distress.      Comments: 2L nasal cannula  Abdominal:      General: There is no distension.      Palpations: Abdomen is soft.      Tenderness: There is no abdominal tenderness.   Musculoskeletal:         General: Normal range of motion.   Skin:     General: Skin is warm and dry.   Neurological:      General: No focal deficit present.      Mental Status: She is disoriented.      Sensory: No sensory deficit.      Motor: No weakness.      Comments: GCS 13 (E3V4M6)       IMAGING SUMMARY:  (summary of new imaging findings, not a copy of dictation)  CT head: stable SAH, SDH, IPH  CT maxillofacial: ethmoid sinus fracture    LABS:  Results from last 7 days   Lab Units 10/29/24  0222 10/28/24  2348 10/28/24  2234 10/28/24  2109   WBC AUTO x10*3/uL 16.1* 16.1* 13.1* 9.6   HEMOGLOBIN g/dL 12.9 12.4 11.9* 12.6   HEMATOCRIT % 39.9 37.2 35.3* 39.1   PLATELETS AUTO x10*3/uL 229 189 195 202   NEUTROS PCT AUTO %  --   --  76.5 47.4   LYMPHS PCT AUTO %  --   --  18.5 45.0   MONOS PCT AUTO %  --   --  2.8 4.8   EOS PCT AUTO %  --   --  0.7 1.3     Results from last 7 days   Lab Units 10/28/24  2348 10/28/24  2234 10/28/24  2109   APTT seconds 27  --   --    INR   --  0.9 1.0     Results from last 7 days   Lab Units 10/29/24  1211 10/29/24  0222 10/28/24  2348 10/28/24  2234 10/28/24  2109   SODIUM mmol/L 134* 134* 131* 132* 133*   POTASSIUM mmol/L 4.7 3.4* 3.6 3.4* 3.3*   CHLORIDE mmol/L 99 97* 98 99 102   CO2 mmol/L 22 21 19* 20* 20*   BUN mg/dL 8 11 12 13 14   CREATININE mg/dL 0.52 0.54 0.63 0.65 0.79   CALCIUM mg/dL 8.7 8.4* 8.2* 8.2* 8.8   PROTEIN TOTAL g/dL  --   --   --   7.1 7.1   BILIRUBIN TOTAL mg/dL  --   --   --  0.4 0.4   ALK PHOS U/L  --   --   --  56 60   ALT U/L  --   --   --  24 26   AST U/L  --   --   --  36 36   GLUCOSE mg/dL 132* 136* 159* 163* 160*     Results from last 7 days   Lab Units 10/28/24  9697 10/28/24  2109   BILIRUBIN TOTAL mg/dL 0.4 0.4           I have reviewed all medications, laboratory results, and imaging pertinent for today's encounter.

## 2024-10-29 NOTE — DISCHARGE INSTRUCTIONS
[facility]  DISCHARGE INSTRUCTIONS    GENERAL INSTRUCTIONS  1) Diet: Resume regular diet that you were consuming prior to admission.     2) Activity:   - Move around as you are able  - Avoid strenuous activities including intensive movements as you are healing.   - Normal activity as tolerated until follow up visits.  Driving: No driving while taking narcotic medications and until follow up visits.  Showering/Bathing: You may shower and bathe once all of your dressings are removed.     3) Medications:   - You are to resume all your home medications as previously prescribed unless otherwise indicated or instructed. Additionally, you have been given a prescription for Tylenol (every 8 hours as needed for pain/fever/headache for 10 days), Oxycodone (every 6 hours as needed for moderate/severe pain for 5 days), and Joana-Colace (x7 days for constipation). If refills are needed for your medications, please don't hesitate to reach out to your primary care provider.   - You may resume your aspirin on post-bleed day 7, 11/4.  - Do not resume your home Plavix until instructed at your follow up appointments.    4) Wound care:  None of your lacerations are closed with sutures. Please apply bacitracin to your wounds and dress with kerlix and ace wrap. Please ensure daily dressing changes occur. You are allowed to shower. Do not scrub lacerations - simply let soap and water fall over them. Please avoid picking at your lacerations.     5) Follow up appointments:  - Trauma Surgery: A trauma clinic follow up is not necessary regarding your recent hospital admission. Follow up with the trauma surgery clinic as needed/desired. To follow up, please call the trauma clinic at [phone] to schedule your appointment. Do not hesitate to call our outpatient nurse coordinator at [phone] with any questions/concerns. The nurse will get back to you within 48-72 hours. If you feel it is an emergency,  please proceed to your nearest Emergency Room.  - Neurosurgery: If you have not been contacted within 2-3 days of discharge from the hospital, please call [phone] to schedule your follow up appointment with a neurosurgeon within 2 weeks of discharge regarding your recent hospital admission.  - Primary Care Provider: Please follow up with your PCP within 1-2 weeks after discharge regarding your recent hospital admission. If you do not have a primary care provider, you may also call the hospital main number at [phone] and ask for a referral.  - Plastic Surgery: You may follow up with plastic surgery following hospital discharge on an as needed basis, no scheduled follow up is required. To schedule an appointment please contact the office at [phone].    6) Please notify your physician if you have the following symptoms.     - Fever > 100.5 F (>38 C), chills  - Uncontrolled nausea and/or vomiting  - Chest pain  - New or worsening shortness of breathe  - Uncontrolled diarrhea   - You stop passing gas   - Have new bruising, rashes, blistering on your body  - New numbness/tingling, loss of feeling in any part of your body or a new decreased ability to move any part of your body  - New or worsening swelling  - Uncontrolled pain    If you display any of the following signs/symptoms: increased confusion, altered mental status, new numbness or tingling, decreased sensation or movement, pain that is uncontrolled with pain medications, increased shortness of breath, chest pain/palpitations, or any other concerning signs/symptoms, please proceed to your nearest Emergency Department for further evaluation and management.    From Plastic Surgery regarding facial fractures:   You have broken (fractured) one or more bones in your face. Swelling and bruising from the injury are likely to get worse over the first couple of days. After that, the swelling should steadily improve until it is gone. If you have bruises on  your face, they may change as they heal. The skin may turn from black and blue to green to yellow or brown before it returns to its normal color. It may take several weeks for your injury to heal.    You should maintain sinus precautions for 4-6 weeks post injury which include:       ·  Keeping head of bed elevated at all times, greater than 30 degrees       ·  No blowing nose       ·  No forcibly spitting or smoking        ·  No use of straws to drink       ·  Keep mouth open when coughing       ·  No lifting greater than 15-20 pounds   Most fractures of this nature do not require surgery and heal independently over several weeks. You may follow up with plastic surgery following hospital discharge on an as needed basis, no scheduled follow up is required. To schedule an appointment please contact our office at [phone].    HOLD PLAVIX UNTIL OTHERWISE SPECIFIED BY NEUROSURGEON

## 2024-10-29 NOTE — PROGRESS NOTES
"[facility]  TRAUMA ICU - PROGRESS NOTE    Patient Name: Pia Nguyen  MRN: [id number]  Admit Date: 1028  : 1955  AGE: 69 y.o.   GENDER: female  ==============================================================================   [###USE THIS SECTION FOR TRAUMA PATIENTS ONLY###]  MECHANISM OF INJURY:   Fall down 5 stairs  LOC (yes/no?): yes, (+) ETOH  Anticoagulant / Anti-platelet Rx? (for what dx?): DAPT for PVD  Referring Facility Name (N/A for scene EMR run): [facility]    INJURIES:   L frontal SAH  Thin L SDH  IPH near L silvian fissure    OTHER MEDICAL PROBLEMS:  PVD  HTN  GERD    INCIDENTAL FINDINGS:  N/A    PROCEDURES:      ==============================================================================  TODAY'S ASSESSMENT AND PLAN OF CARE:  Pia Nguyen is a 69 y.o. female in the ICU due to: frequent neuro checks    NEURO/PAIN/SEDATION: prn tylenol, dilaudid  #Multiple ICH, SAH/SDH/IPH  -TBI precautions, HOB >30, brain rest  -rpt CT 0500, add maxface for ? pneumocephalus  RESPIRATORY: 2L nc  CARDIOVASC: SBP < 160 prn hydral labetalol  GI: NPO given n/v, prn zofran  : kassandra for strict I/Os   FEN: NS 75/hr  HEMATOLOGIC: daily CBC  ENDOCRINE: q4 glucose checks while NPO  MUSCULOSKELETAL/SKIN: skin precautions per unit standard  INFECTIOUS DISEASE: no concerns at this time  GI PROPHYLAXIS: not indicated  DVT PROPHYLAXIS: SCDs, reconsider chemoppx in AM    DISPOSITION: Trauma ICU  ==============================================================================  CHIEF COMPLAINT / OVERNIGHT EVENTS / HPI:   Had some nausea downstairs, now just tired. Overall feeling okay, but still confused.    MEDICAL HISTORY / ROS:  Admission history and ROS reviewed. Pertinent changes as follows:  N/A    PHYSICAL EXAM:  Heart Rate:  [60-90]   Temperature:  [36.5 °C (97.7 °F)-36.7 °C (98 °F)]   Respirations:  [12-28]   BP: (134-180)/()   Height:  [167.6 cm (5' 6\")]   Weight:  [72.6 kg " (160 lb)]   Pulse Ox:  [83 %-99 %]   Physical Exam  Vitals and nursing note reviewed.   Constitutional:       General: She is not in acute distress.     Appearance: Normal appearance. She is normal weight. She is not ill-appearing or diaphoretic.   HENT:      Head: Normocephalic and atraumatic.      Comments: Dried blood on face     Mouth/Throat:      Mouth: Mucous membranes are moist.      Pharynx: Oropharynx is clear. No oropharyngeal exudate or posterior oropharyngeal erythema.   Eyes:      General: No scleral icterus.     Extraocular Movements: Extraocular movements intact.      Conjunctiva/sclera: Conjunctivae normal.      Pupils: Pupils are equal, round, and reactive to light.   Cardiovascular:      Rate and Rhythm: Normal rate and regular rhythm.      Pulses: Normal pulses.      Heart sounds: Normal heart sounds. No murmur heard.     No gallop.   Pulmonary:      Effort: Pulmonary effort is normal. No respiratory distress.      Breath sounds: Normal breath sounds. No stridor. No wheezing, rhonchi or rales.   Abdominal:      General: Bowel sounds are normal. There is no distension.      Palpations: Abdomen is soft. There is no mass.      Tenderness: There is no abdominal tenderness. There is no guarding or rebound.      Hernia: No hernia is present.   Musculoskeletal:         General: No swelling, deformity or signs of injury. Normal range of motion.      Cervical back: Normal range of motion and neck supple. No tenderness.      Right lower leg: No edema.      Left lower leg: No edema.      Comments: 5/5 strength dorsiflexion/extension, hand , elbow flexion extension, shoulder shrug bilaterally   Skin:     General: Skin is warm.      Capillary Refill: Capillary refill takes less than 2 seconds.      Findings: No erythema, lesion or rash.   Neurological:      General: No focal deficit present.      Mental Status: She is alert. Mental status is at baseline.      Comments: Oriented to self, place, not time.  Otherwise nonfocal exam   Psychiatric:         Mood and Affect: Mood normal.         Behavior: Behavior normal.         IMAGING SUMMARY:  (summary of new imaging findings, not a copy of dictation)  Worsening IPH and SAH with pneumocephalus c/f basilar skull fx    LABS:  Results from last 7 days   Lab Units 10/28/24  2234 10/28/24  2109   WBC AUTO x10*3/uL 13.1* 9.6   HEMOGLOBIN g/dL 11.9* 12.6   HEMATOCRIT % 35.3* 39.1   PLATELETS AUTO x10*3/uL 195 202   NEUTROS PCT AUTO % 76.5 47.4   LYMPHS PCT AUTO % 18.5 45.0   MONOS PCT AUTO % 2.8 4.8   EOS PCT AUTO % 0.7 1.3     Results from last 7 days   Lab Units 10/28/24  2234 10/28/24  2109   INR  0.9 1.0     Results from last 7 days   Lab Units 10/28/24  2234 10/28/24  2109   SODIUM mmol/L 132* 133*   POTASSIUM mmol/L 3.4* 3.3*   CHLORIDE mmol/L 99 102   CO2 mmol/L 20* 20*   BUN mg/dL 13 14   CREATININE mg/dL 0.65 0.79   CALCIUM mg/dL 8.2* 8.8   PROTEIN TOTAL g/dL 7.1 7.1   BILIRUBIN TOTAL mg/dL 0.4 0.4   ALK PHOS U/L 56 60   ALT U/L 24 26   AST U/L 36 36   GLUCOSE mg/dL 163* 160*     Results from last 7 days   Lab Units 10/28/24  2234 10/28/24  2109   BILIRUBIN TOTAL mg/dL 0.4 0.4         I have reviewed all medications, laboratory results, and imaging pertinent for today's encounter.

## 2024-10-29 NOTE — PROGRESS NOTES
Spiritual Care Visit    Clinical Encounter Type  Visited With: Patient  Routine Visit: Introduction  Continue Visiting: Yes    Baptist Encounters  Baptist Needs: Prayer         Sacramental Encounters  Other Sacrament: P&B S    Patient was asleep so did not disturb, received a prayer and blessing (P&B S) by Fr. Sathya Calderon,  [employer]ental orth[employer] .    Family member was present.

## 2024-10-29 NOTE — ED PROVIDER NOTES
HPI   Chief Complaint   Patient presents with    Trauma       Patient is 69-year-old female with charted past medical history of PAD (on aspirin, Plavix), HTN, GERD who presents as a transfer from [facility] with acute left frontal subarachnoid, left subdural hematoma, left temporal IPH after fall downstairs.  Patient unable to provide history but per report, patient fell down approximately 6 stairs after drinking alcohol with family members.            Patient History   No past medical history on file.  No past surgical history on file.  No family history on file.  Social History     Tobacco Use    Smoking status: Not on file    Smokeless tobacco: Not on file   Substance Use Topics    Alcohol use: Not on file    Drug use: Not on file       Physical Exam   ED Triage Vitals [10/28/24 2226]   Temp Heart Rate Respirations BP   -- 83 18 180/90      Pulse Ox Temp src Heart Rate Source Patient Position   (!) 89 % -- -- --      BP Location FiO2 (%)     -- --       Physical Exam  Constitutional:       Appearance: Normal appearance.   HENT:      Head: Normocephalic.      Comments: Dried blood on face.  Dried vomit with recurrent episodes of nonbloody emesis.  Eyes:      Extraocular Movements: Extraocular movements intact.      Pupils: Pupils are equal, round, and reactive to light.      Comments: 4->2 and reactive bilaterally.   Cardiovascular:      Rate and Rhythm: Normal rate.      Comments: 2+ bilateral radial and PT pulses.  Pulmonary:      Effort: Pulmonary effort is normal.   Abdominal:      Comments: Soft, nondistended, vomits during palpation   Musculoskeletal:         General: Normal range of motion.      Cervical back: Normal range of motion.   Skin:     General: Skin is warm and dry.   Neurological:      Mental Status: She is alert.      Comments: Confused, alert, oriented to name, confused about recent events and location.  Moving all extremities with fair strength and does follow commands with repeated prompting.   Responds to stimuli in upper and lower extremities.  No gaze preference, pupils 4-2 and reactive bilaterally.   Psychiatric:         Mood and Affect: Mood normal.         Behavior: Behavior normal.           ED Course & MDM   Diagnoses as of 10/28/24 2336   SAH (subarachnoid hemorrhage) (Multi)                 No data recorded     Esteban Coma Scale Score: 14 (10/28/24 2237 : Zenia Aragon RN)                           Medical Decision Making  Patient is 69-year-old female with charted past medical history of PAD (on aspirin, Plavix), HTN, GERD who presents as a transfer from [facility] with acute left frontal subarachnoid, left subdural hematoma, left temporal IPH after fall downstairs.  Appearance consistent with traumatic injury.  Repeat CT head here with worsening of IPH.  Patient did have several episodes of emesis in the trauma bay but well-controlled and no evidence of aspiration.  GCS remained 14 with significant confusion but spontaneously moving all extremities, following some commands both before and after CT scan.  Patient protecting airway and intubation deferred at this time.  Treated with Zofran and droperidol for nausea and agitated behavior.  QTc 491 on EKG after meds.  Neurosurgery consulted.  Case discussed with trauma surgery and patient admitted to  ICU for further management.    Patient seen and discussed with Dr. Anthony Carrillo MD, PhD  Emergency Medicine PGY3          Procedure  Procedures     Woo Carrillo MD  Resident  10/29/24 2661

## 2024-10-29 NOTE — PROGRESS NOTES
Physical Therapy    Physical Therapy Evaluation    Patient Name: iPa Nguyen  MRN: [id number]  [facility]  Room: 03/03-A  Today's Date: 10/29/2024   Time Calculation  Start Time: 0930  Stop Time: 0943  Time Calculation (min): 13 min    Assessment/Plan   PT Assessment  PT Assessment Results: Decreased endurance, Decreased mobility, Impaired balance, Decreased cognition, Pain  Rehab Prognosis: Good  Barriers to Discharge: Prior h/o falls, TBI  Evaluation/Treatment Tolerance: Patient limited by fatigue  Medical Staff Made Aware: Yes  End of Session Communication: Bedside nurse  Assessment Comment: Pt is a 70 yo female admitted s/p fall with resultant TBI. Upon PT eval, pt moves all extremities with what appears to be appropraite strength and motor planning however overall safety, quality, and tolerance of mobility is limited by agitation related to TBI. Anticipate this will improve as pt progresses. Skilled PT indicated to progress safety and independence with mobility.  End of Session Patient Position: Bed, 3 rail up, Alarm off, not on at start of session  IP OR SWING BED PT PLAN  Inpatient or Swing Bed: Inpatient  PT Plan  Treatment/Interventions: Bed mobility, Transfer training, Gait training, Stair training, Balance training, Neuromuscular re-education, Strengthening, Endurance training, Range of motion, Therapeutic exercise, Therapeutic activity, Home exercise program  PT Plan: Ongoing PT  PT Frequency: 4 times per week  PT Discharge Recommendations: High intensity level of continued care (TBI)  PT Recommended Transfer Status: Assist x2  PT - OK to Discharge: Yes    Subjective   General Visit Information:  General  Reason for Referral: p/w fall down stairs, +HS. CTH 1.1x1.4 L temporal contusion, thin L frontal and tentorial SDH, L sylvian and basilar cistern SAH, bifrontal scattered pneumocephalus, CTH blossoming contusion, incr temporal and LF SDH, thin R tentorial SDH, L parietal contusion  Past  Medical History Relevant to Rehab: 69 y.o. female with h/o HTN, HLD, PAD s/p L fem and popliteal artery angioplasty 7/2023, on ASA/PLX, asthma, COPD, hypothyroidism, depression  Family/Caregiver Present: Yes  Caregiver Feedback:  present  Co-Treatment: OT  [facility] emerging VI  Prior to Session Communication: Bedside nurse  Patient Position Received: Bed, 3 rail up, Alarm off, not on at start of session  Preferred Learning Style: auditory, verbal, visual  General Comment: Pt alert with stimulation however minimally participatory related to TBI/cog impairments. Bed level assessment performed however unable to progress to EOB sitting as pt was becoming agitated and elevating BP above goal. Will continue to progress  mobility as tolerated. 4L NC, PIV, [place] collar.  Home Living:  Home Living  Type of Home: House  Lives With: Spouse  Home Adaptive Equipment: None  Home Layout: One level, Laundry in basement  Home Access: Stairs to enter without rails  Entrance Stairs-Rails: None  Entrance Stairs-Number of Steps: 3  Bathroom Shower/Tub: Tub/shower unit  Bathroom Toilet: Standard  Bathroom Equipment: Grab bars in shower  Home Living Comments: Obtained from OT eval (per ) as pt was unable to answer questions on eval)  Prior Level of Function:  Prior Function Per Pt/Caregiver Report  Level of [place]: Independent with ADLs and functional transfers, Independent with homemaking with ambulation  ADL Assistance: Independent  Homemaking Assistance: Independent  Ambulatory Assistance: Independent  Vocational: Part time employment (At school)  Hand Dominance: Right  Prior Function Comments: Pt  reports IND with all ADL and IADLs. +drive, +falls.  Precautions:  Precautions  Medical Precautions: Fall precautions, Spinal precautions  Precautions Comment: [place] collar, SBP <160, Sinus precautions, HOB >30 deg     Vital Signs (Past 2hrs)       10/29/24 0930 10/29/24 0943    Vital Signs   Vitals Session Pre PT Post PT   Heart Rate 87 87   Resp 22 18   SpO2 98 % 97 %   /72 163/63          Vital Signs Comment: BP to 172/65 with activity in bed     Objective   Pain:  Pain Assessment  Pain Assessment: 0-10  0-10 (Numeric) Pain Score: 8  Pain Interventions: Repositioned  Cognition:  Cognition  Overall Cognitive Status: Impaired  Orientation Level: Disoriented to place, Disoriented to time, Disoriented to situation  Following Commands:  (Appeared to comprehend verbal commands however limited participation 2/2 agitation)  Cognition Comments: [place] IV emerging V    General Assessments:    Activity Tolerance  Endurance: Tolerates less than 10 min exercise with changes in vital signs  Early Mobility/Exercise Safety Screen: Proceed with mobilization - No exclusion criteria met  Activity Tolerance Comments: BP >goal with agitation    Sensation  Light Touch: No apparent deficits  Sensation Comment: Pt responded to light tactile stim in bilat LEs    Coordination  Coordination Comment: Appeared to have appropriate motor planning with self-directed movements such as rolling over in bed - however pt unable to participate in formal testing 2/2 mental status    Postural Control  Postural Control: Impaired    Static Sitting Balance  Static Sitting-Comment/Number of Minutes: CGA for brief long sitting in bed - pt unable to sit up at EOB 2/2 mental status    Functional Assessments:  Bed Mobility  Bed Mobility: Yes  Bed Mobility 1  Bed Mobility 1: Scooting (Boosting)  Level of Assistance 1: Dependent, +2  Bed Mobility Comments 1: Dependent boosting in bed required for positioning and maintaining precautions  Bed Mobility 2  Bed Mobility  2: Forward lean  Level of Assistance 2: Moderate assistance (x2)  Bed Mobility Comments 2: Assist required to initiate fwd lean however pt able to briefly maintain long sitting balance in bed with CGA  Bed Mobility 3  Bed Mobility 3: Rolling left, Rolling  right  Level of Assistance 3: Close supervision  Bed Mobility Comments 3: Pt performed self-directed rolling over in bed with appropriate motor planning    Transfers  Transfer: No (Unable to progress to EOB 2/2 pt participation + BP reaching goal)    Extremity/Trunk Assessments:    RLE   RLE :  (PROM grossly WFL, LE strength appearing equal bilaterally and WFL though limited formal testing related to mental status)  LLE   LLE :  (PROM grossly WFL, LE strength appearing equal bilaterally and WFL though limited formal testing related to mental status)  Outcome Measures:  [facility] Basic Mobility  Turning from your back to your side while in a flat bed without using bedrails: A little  Moving from lying on your back to sitting on the side of a flat bed without using bedrails: Total (Unable 2/2 medical status (BP exceeding goal))  Moving to and from bed to chair (including a wheelchair): Total  Standing up from a chair using your arms (e.g. wheelchair or bedside chair): Total  To walk in hospital room: Total  Climbing 3-5 steps with railing: Total  Basic Mobility - Total Score: 8    FSS-ICU  Ambulation: Unable to attempt due to weakness  Rolling: Supervision or set-up only  Sitting: Total assistance (performs 25% or requires another person)  Transfer Sit-to-Stand: Total assistance (performs 25% or requires another person)  Transfer Supine-to-Sit: Total assistance (performs 25% or requires another person)  Total Score: 8      Early Mobility/Exercise Safety Screen: Proceed with mobilization - No exclusion criteria met  ICU Mobility Scale: Sitting in bed, exercises in bed [1]    Encounter Problems       Encounter Problems (Active)       Balance       STG - Maintains dynamic standing balance with upper extremity support on LRAD with Sup using LRAD       Start:  10/29/24    Expected End:  11/19/24            STG - Maintains dynamic sitting balance without upper extremity support with Sup       Start:  10/29/24    Expected End:   11/19/24               Mobility       STG - Patient will ambulate x100 ft with Sup using LRAD       Start:  10/29/24    Expected End:  11/19/24            STG - Patient will ascend and descend 3 stairs with rail vs LRAD with CGA       Start:  10/29/24    Expected End:  11/19/24               PT Transfers       STG - Patient will perform bed mobility with Sup       Start:  10/29/24    Expected End:  11/19/24            STG - Patient will transfer sit to and from stand with Sup using LRAD       Start:  10/29/24    Expected End:  11/19/24                   Education Documentation  Mobility Training, taught by Brittney Patel, PT at 10/29/2024  1:31 PM.  Learner: Patient  Readiness: Nonacceptance  Method: Explanation, Demonstration  Response: Needs Reinforcement    Education Comments  No comments found.        Brittney Patel PT, DPT

## 2024-10-29 NOTE — CARE PLAN
Problem: Skin  Goal: Decreased wound size/increased tissue granulation at next dressing change  Outcome: Progressing  Goal: Participates in plan/prevention/treatment measures  Outcome: Progressing  Goal: Prevent/manage excess moisture  Outcome: Progressing  Goal: Prevent/minimize sheer/friction injuries  Outcome: Progressing  Goal: Promote/optimize nutrition  Outcome: Progressing  Goal: Promote skin healing  Outcome: Progressing     Problem: Pain - Adult  Goal: Verbalizes/displays adequate comfort level or baseline comfort level  Outcome: Progressing     Problem: Safety - Adult  Goal: Free from fall injury  Outcome: Progressing     Problem: Discharge Planning  Goal: Discharge to home or other facility with appropriate resources  Outcome: Progressing     Problem: Chronic Conditions and Co-morbidities  Goal: Patient's chronic conditions and co-morbidity symptoms are monitored and maintained or improved  Outcome: Progressing   The patient's goals for the shift include      The clinical goals for the shift include  neuro status remains unchanged for duration of shift    Over the shift, the patient did not make progress toward the following goals. Barriers to progression include patient is altered(TBI) and is not wanting to work with teams for PT/OT, neuro, and ICU team for assessments and care. Recommendations to address these barriers include patient work with PT/OT, OOB to bedside chair when safe.

## 2024-10-29 NOTE — ED TRIAGE NOTES
From [facility]. patient on plavix and aspirin. patient was drinking today. patient fell down 5 stairs. patient taken to hospital and had head bleed. patient given keppra at [facility]. AO to self. patient CT was cleared.

## 2024-10-29 NOTE — CONSULTS
Inpatient consult to Neurosurgery  Consult performed by: Anant Hoffmann MD  Consult ordered by: Adrianna Cruz MD      Date of Service:  10/28/2024 Attending Provider:  Jordon Hays MD;Humble Ochoa DO     Reason for Consultation:  Pia Nguyen is being seen today for a consult requested by Jordon Hays MD;Humble Ochoa DO for SAH.      Subjective   History of Present Illness:  Pia is a 69 y.o. female with h/o HTN, HLD, PAD s/p L fem and popliteal artery angioplasty 7/2023, on ASA/PLX, asthma, COPD, hypothyroidism, depression, p/w fall down stairs, +HS. Patient had a fall down the stairs after her legs felt weak and gave out. Patient takes ASA and PLX for PAD and last took this morning    Review of Systems   10 point ROS is obtained and negative except the ones mentioned in the HPI    Objective     Vitals:  Vitals:    10/28/24 2300   BP:    Pulse:    Resp:    Temp: 36.7 °C (98 °F)   SpO2:          Exam:  Constitutional: Agitated, no acute distress, wearing cervical collar  Resp: breathing comfortably on NC  Cardio: well perfused  GI: nondistended  MSK: full range of motion  Neuro:   Exam limited by agitation and patient cooperation  Awake, Ox1 (self)  EOMI, FS  Follows commands in all 4 extremities, antigravity and symmetric, (thumbs up, wiggles toes, bends knees)  Psych: appropriate  Skin: ecchymosis and abrasion near R parietal boss, no open lacerations    Medical History  No past medical history on file.    Surgical History  No past surgical history on file.     Medications  No current outpatient medications     Diagnostic Results:    Lab Results   Component Value Date    WBC 13.1 (H) 10/28/2024    HGB 11.9 (L) 10/28/2024    HCT 35.3 (L) 10/28/2024    MCV 83 10/28/2024     10/28/2024     Lab Results   Component Value Date    CREATININE 0.65 10/28/2024    BUN 13 10/28/2024     (L) 10/28/2024    K 3.4 (L) 10/28/2024    CL 99 10/28/2024    CO2 20 (L) 10/28/2024     Lab Results   Component  Value Date    INR 0.9 10/28/2024    INR 1.0 10/28/2024    PROTIME 10.5 10/28/2024    PROTIME 10.3 10/28/2024       === 10/28/24 ===    CT HEAD WO IV CONTRAST (Wet Read)  This result has not been signed. Information might be incomplete.    - Impression -  1. The acute intraparenchymal hemorrhages in the left temporal lobe  have also increased in size from 1.5 cm to 4.0 cm. There is a  associated equivocal mass effect of the left cerebral jose de jesus on the  left midbrain (series 207, image 16).  2. Redemonstrated acute subarachnoid hemorrhages within the left  frontal lobe, left sylvian fissure, in the left basal cisterns.  3. Interval worsening of the left subdural hematoma along the  tentorium measuring up to 0.4 cm without evidence of midline shift.  4. Redemonstrated high suspicion of skull base fracture involving the  anterior cranial fossa given the presence of pneumocephalus  predominantly within the anterior cranial fossa.  5. Similar large right temporal scalp hematoma.    I personally reviewed the images/study and I agree with the findings  as stated by Chet Andino MD. This study was interpreted at  [facility], [place], [place].    The above findings were communicated to Serena Ureña PA-C by epic  chat with readback verification at 11:42 pm.    MACRO:  Critical Finding:  See findings. Notification was initiated on  10/28/2024 at 11:42 pm by  Chet Andino.  (**-OCF-**)      Dictation workstation:   [id number]        Assessment/Plan   Assessment:  Pia is a 69 y.o. female with h/o HTN, HLD, PAD s/p L fem and popliteal artery angioplasty 7/2023, on ASA/PLX, asthma, COPD, hypothyroidism, depression, p/w fall down stairs, +HS. CTH 1.1x1.4 L temporal contusion, thin L frontal and tentorial SDH, L sylvian and basilar cistern SAH, bifrontal scattered pneumocephalus, CTH blossoming contusion, incr temporal and LF SDH, thin R tentorial SDH, L parietal contusion    Plan:  Trauma  primary  TSICU for Q1 neuro checks  Will need repeat CT head 6 hours from prior scan for stability   Recommend CT face or CT head with thin cuts to evaluate for skull fracture given pneumocephalus  Recommend keppra for seizure ppx x7d   Hold ASA and PLX  Recommend obtaining CBC, RFP, coag, T&S, UA, EKG, CXR, consider TEG with ASA/PLX use  Goal SBP <160  Hold DVT ppx until PBD 2    Anantluan Hoffmann MD  Plan not finalized until note signed by attending

## 2024-10-29 NOTE — PROGRESS NOTES
Pia Nguyen is a 69 y.o. female on day 1 of admission presenting with SAH (subarachnoid hemorrhage) (Multi).    Subjective   No acute events    Objective     Physical Exam  A&Ox2  Face symmetric  RUE 5/5  LUE 5/5  RLE 5/5  LLE 5/5  Sensation intact to light touch throughout all extremities      Last Recorded Vitals  Blood pressure 142/79, pulse 69, temperature 36 °C (96.8 °F), temperature source Temporal, resp. rate 24, SpO2 98%.  Intake/Output last 3 Shifts:  No intake/output data recorded.    Relevant Results      Assessment/Plan   Principal Problem:    SAH (subarachnoid hemorrhage) (Multi)    Pia is a 69 y.o. female with h/o HTN, HLD, PAD s/p L fem and popliteal artery angioplasty 7/2023, on ASA/PLX, asthma, COPD, hypothyroidism, depression, p/w fall down stairs, +HS. CTH 1.1x1.4 L temporal contusion, thin L frontal and tentorial SDH, L sylvian and basilar cistern SAH, bifrontal scattered pneumocephalus, CTH blossoming contusion, incr temporal and LF SDH, thin R tentorial SDH, L parietal contusion     PLAN  Trauma primary  Q1 neuro checks  rCTH pending  Recommend keppra for seizure ppx x7d   Hold ASA and PLX  Goal SBP <160  Hold DVT ppx until PBD 2    Arnaldo Sherwood MD

## 2024-10-29 NOTE — PROGRESS NOTES
Pharmacy Admission Order Reconciliation Review    Pia Nguyen is a 69 y.o. female admitted for SAH (subarachnoid hemorrhage) (Multi). Pharmacy reviewed the patient's unreconciled admission medications.    Prior to admission medications that were reviewed and acted on by the pharmacist include:  Aspirin  Azelastine  Benzonatate  Calcium carbonate  Carvedilol  Clopidogrel  Ibandronate  Fish oil  Rosuvastatin   These medications have been reconciled.     Any other unreconcilied medications have been addressed and will be ordered or held by the patient's medical team. Medications addressed by the pharmacist may be added or changed by the patient's medical team at any time.    Radha Rivero, PharmD  Transitions of Care Pharmacist  [facility]  Please reach out via Secure Chat for questions

## 2024-10-29 NOTE — PROGRESS NOTES
[facility]  TRAUMA ICU - PROGRESS NOTE    Patient Name: Pia Nguyen  MRN: [id number]  Admit Date: 1028  : 1955  AGE: 69 y.o.   GENDER: female  ==============================================================================  MECHANISM OF INJURY:   Fall down 5 stairs  LOC (yes/no?): yes, (+) ETOH  Anticoagulant / Anti-platelet Rx? (for what dx?): DAPT for PVD  Referring Facility Name (N/A for scene EMR run): [facility]    INJURIES:   L frontal SAH  Thin L SDH  IPH near L silvian fissure  pneumocephalus    OTHER MEDICAL PROBLEMS:  PVD  HTN  HLD  GERD  COPD/asthma  hypothyroidism    INCIDENTAL FINDINGS:  Bilateral carotid stenosis    PROCEDURES:  none    ==============================================================================  TODAY'S ASSESSMENT AND PLAN OF CARE:  Pia Nguyen is a 69 y.o. female in the ICU due to: frequent neuro checks    NEURO/PAIN/SEDATION:   #Multiple ICH, SAH/SDH/IPH  #history of depression  #ETOH  -Nsgy consulted>hold DAPT, SBP <160, hold DVT ppx until PBD2, Keppra x7d   -3rd rCTH stable at 0500 after worsening on 2nd scan, nsgy recommending scan 10/30 in AM  -TBI precautions, HOB >30, brain rest  -q1h neuro checks  -attempted to clear c-collar x2, initially patient not cooperative, second time at 1430 she endorsed neck pain  with and without movement. Will leave in collar for now with a plan to reevaluate in the morning.   -start home Zoloft  -daily ETOH minimum 3-4 beers. PAWS currently 2, can reevaluate if becomes symptomatic     EENT  #ethmoid sinus fx  -Plastics consulted for face  -Unasyn started    RESPIRATORY:   #PMH COPD/Asthma  - on 2L nc, wean as able  -Q1h IS   -continue home PRN albuterol     CARDIOVASC:   #PMH HTN, HLD, PAD s/p arthrectomy with left SFA angioplasty   -SBP < 160 prn hydral labetalol  -can likely restart home carvedilol 6.25mg today   -hold home ASA/Plavix    GI:   -NPO until stability scan  - prn zofran    :  "  -no active issues, voiding independently    FEN:   #hypokalemia  #hypocalcemia  - NS 75/hr until taking PO   - given 40mEq of potassium for K of 3.4  -1g calcium gluconate for calcium 1.07    HEMATOLOGIC:   -no active issues, daily CBC    ENDOCRINE:  #pmh hypothyroidism   - q4 glucose checks while NPO, 145-160 since arrival   - continue home Synthroid, 100mcg     MUSCULOSKELETAL/SKIN:   #pmh osteoporosis  -hold home Boniva   -skin precautions per unit standard    INFECTIOUS DISEASE:   -leukocytosis likely reactive 2/2 trauma, monitor for SIRS    GI PROPHYLAXIS: continue home omeprazole as Protonix     DVT PROPHYLAXIS: SCDs, can start LVX 48 h post stability scan     DISPOSITION: Continue care in Trauma ICU  ==============================================================================  CHIEF COMPLAINT / OVERNIGHT EVENTS / HPI:   Admitted overnight, stable CTH at 5am    MEDICAL HISTORY / ROS:  Admission history and ROS reviewed. Pertinent changes as follows:  N/A    PHYSICAL EXAM:  Heart Rate:  [60-92]   Temp:  [36 °C (96.8 °F)-36.7 °C (98 °F)]   Resp:  [12-36]   BP: (116-180)/()   Height:  [167.6 cm (5' 6\")]   Weight:  [72.6 kg (160 lb)]   SpO2:  [83 %-100 %]   Physical Exam  Vitals and nursing note reviewed.   Constitutional:       General: She is not in acute distress.     Appearance: Normal appearance. She is normal weight. She is not ill-appearing or diaphoretic.   HENT:      Head: Normocephalic and atraumatic.      Mouth/Throat:      Mouth: Mucous membranes are moist.      Pharynx: Oropharynx is clear. No oropharyngeal exudate or posterior oropharyngeal erythema.   Eyes:      General: No scleral icterus.     Extraocular Movements: Extraocular movements intact.      Conjunctiva/sclera: Conjunctivae normal.      Pupils: Pupils are equal, round, and reactive to light.   Cardiovascular:      Rate and Rhythm: Normal rate and regular rhythm.      Pulses: Normal pulses.      Heart sounds: Normal heart sounds. " No murmur heard.     No gallop.   Pulmonary:      Effort: Pulmonary effort is normal. No respiratory distress.      Breath sounds: Normal breath sounds. No stridor. No wheezing, rhonchi or rales.   Abdominal:      General: Bowel sounds are normal. There is no distension.      Palpations: Abdomen is soft. There is no mass.      Tenderness: There is no abdominal tenderness. There is no guarding or rebound.      Hernia: No hernia is present.   Musculoskeletal:         General: No swelling, deformity or signs of injury. Normal range of motion.      Cervical back: Normal range of motion and neck supple. No tenderness.      Right lower leg: No edema.      Left lower leg: No edema.   Skin:     General: Skin is warm.      Capillary Refill: Capillary refill takes less than 2 seconds.      Findings: No erythema, lesion or rash.   Neurological:      General: No focal deficit present.      Mental Status: She is alert. Mental status is at baseline.      Comments: GCS 14, a+ox2 sleepy and slow to respond   Psychiatric:         Mood and Affect: Mood normal.         Behavior: Behavior normal.         IMAGING SUMMARY:  (summary of new imaging findings, not a copy of dictation)    LABS:  Results from last 7 days   Lab Units 10/29/24  0222 10/28/24  2348 10/28/24  2234 10/28/24  2109   WBC AUTO x10*3/uL 16.1* 16.1* 13.1* 9.6   HEMOGLOBIN g/dL 12.9 12.4 11.9* 12.6   HEMATOCRIT % 39.9 37.2 35.3* 39.1   PLATELETS AUTO x10*3/uL 229 189 195 202   NEUTROS PCT AUTO %  --   --  76.5 47.4   LYMPHS PCT AUTO %  --   --  18.5 45.0   MONOS PCT AUTO %  --   --  2.8 4.8   EOS PCT AUTO %  --   --  0.7 1.3     Results from last 7 days   Lab Units 10/28/24  2348 10/28/24  2234 10/28/24  2109   APTT seconds 27  --   --    INR   --  0.9 1.0     Results from last 7 days   Lab Units 10/29/24  0222 10/28/24  2348 10/28/24  2234 10/28/24  2109   SODIUM mmol/L 134* 131* 132* 133*   POTASSIUM mmol/L 3.4* 3.6 3.4* 3.3*   CHLORIDE mmol/L 97* 98 99 102   CO2  mmol/L 21 19* 20* 20*   BUN mg/dL 11 12 13 14   CREATININE mg/dL 0.54 0.63 0.65 0.79   CALCIUM mg/dL 8.4* 8.2* 8.2* 8.8   PROTEIN TOTAL g/dL  --   --  7.1 7.1   BILIRUBIN TOTAL mg/dL  --   --  0.4 0.4   ALK PHOS U/L  --   --  56 60   ALT U/L  --   --  24 26   AST U/L  --   --  36 36   GLUCOSE mg/dL 136* 159* 163* 160*     Results from last 7 days   Lab Units 10/28/24  2234 10/28/24  2109   BILIRUBIN TOTAL mg/dL 0.4 0.4         I have reviewed all medications, laboratory results, and imaging pertinent for today's encounter.

## 2024-10-29 NOTE — H&P
[facility]  TRAUMA SERVICE - HISTORY AND PHYSICAL / CONSULT    Patient Name: Pia Nguyen  MRN: [id number]  Admit Date: 1028  : 1955  AGE: 69 y.o.   GENDER: female  ==============================================================================  MECHANISM OF INJURY / CHIEF COMPLAINT:   70 yo F tx from OSH s/p fall down stairs, +ETOH. Pan scan at OSH showed A&O X to self. Transferred via flight to [facility] with new emesis in transfer. Non-cooperative with exam on presentation with continued emesis and Bms. GCS 14-1 for confusion. Pupils PERRL. CT head completed after trauma assessment due to new onset emesis, revealing new IPH.     LOC (yes/no?): yes  Anticoagulant / Anti-platelet Rx? (for what dx?): Plavix and ASA  Referring Facility Name (N/A for scene EMR run): [facility]    INJURIES:   SAH  IPH  Subdural hematoma    OTHER MEDICAL PROBLEMS:  unknown    INCIDENTAL FINDINGS:  N/a    ==============================================================================  ADMISSION PLAN OF CARE:  #SAH   #IPH  #L subdural hematoma  -q1 hr neuro checks  -NPO  -NSGY consulted  -[facility] at 0510 10/29, rCTH may be done sooner if any changes to mental status are appreciated  -BP control: maintain SBP <140   -hydralazine given in ED  -HOB 30 degrees elevation    #ETOH  -CIWA?  -will need tertiary exam in AM 10/29    #FEN/GI  -Bowel reg    #DVT  -SCDs    Consultants notified (specialty, provider name, time): neurosurgery    Dispo: admit to TICU, follow up [facility] at 0510, will need tertiary due to ETOH    Pt seen and discussed with attending Dr. Ochoa     Total face to face time spent with patient/family of 30 minutes, with >50% of the time spent discussing plan of care/management, counseling/educating on disease processes, explaining results of diagnostic testing. I have reviewed all medications, laboratory results, and imaging pertinent for today's encounter.    Ebony Devi PA-C  Trauma,  Critical Care, Acute Care Surgery   Floor: [id number]  TSICU: [id number]    ==============================================================================  PAST MEDICAL HISTORY:   PMH: patient uncooperative with questioning on initial trauma exam  PSH: patient uncooperative with questioning on initial trauma exam  FH: No family history on file.  SOCIAL HISTORY:    Smoking: patient uncooperative with questioning on initial trauma exam      Alcohol: yes, +ETOH at OSH      Drug use: patient uncooperative with questioning on initial trauma exam    MEDICATIONS: med rec requested   Prior to Admission medications    Not on File     ALLERGIES:   No Known Allergies    REVIEW OF SYSTEMS:  Review of Systems   Constitutional:  Negative for chills and fever.   HENT:  Negative for ear discharge and nosebleeds.    Eyes:  Negative for pain and discharge.   Respiratory:  Negative for wheezing.    Gastrointestinal:  Positive for vomiting.   Genitourinary:  Positive for flank pain.        Pressed on abdomen for primary and she immediately vomited   Musculoskeletal:  Negative for neck pain.   Skin:  Positive for wound.   Neurological:  Negative for seizures and speech difficulty.   Psychiatric/Behavioral:  Positive for agitation and confusion.      PHYSICAL EXAM:  PRIMARY SURVEY:  Airway  Airway is patent.     Breathing  Breathing is normal. Right breath sounds are normal. Left breath sounds are normal.     Circulation  Cardiac rhythm is regular. Rate is regular.   Pulses  Radial: 2+ on the right; 2+ on the left.  Femoral: 2+ on the right; 2+ on the left.  Pedal: 2+ on the right; 2+ on the left.    Disability  Rhododendron Coma Score  Eye:4   Verbal:4   Motor:6      14  Pupils  Right Pupil:   round and reactive      3 mm  Left Pupil:   round and reactive      3 mm     Motor Strength   strength:  5/5 on the right  5/5 on the left  Dorsiflex strength:  5/5 on the right  5/5 on the left  Plantarflex strength:  5/5 on the right  5/5 on the left  The  patient does not have a sensory deficit.       SECONDARY SURVEY/PHYSICAL EXAM:  Physical Exam  Constitutional:       General: She is in acute distress.      Appearance: She is ill-appearing.   HENT:      Head: Normocephalic.      Right Ear: External ear normal.      Left Ear: External ear normal.      Nose: Nose normal.      Mouth/Throat:      Mouth: Mucous membranes are moist.      Pharynx: Oropharynx is clear.   Eyes:      Pupils: Pupils are equal, round, and reactive to light.   Cardiovascular:      Rate and Rhythm: Normal rate and regular rhythm.   Pulmonary:      Effort: Pulmonary effort is normal.      Breath sounds: Normal breath sounds.   Abdominal:      General: Abdomen is flat.      Tenderness: There is guarding.      Comments: Emesis during trauma assessment   Musculoskeletal:         General: No tenderness. Normal range of motion.      Cervical back: No tenderness.   Skin:     General: Skin is warm and dry.      Comments: 1 cm abrasion to L elbow   Neurological:      Mental Status: She is disoriented.      Sensory: No sensory deficit.      Motor: No weakness.       IMAGING SUMMARY:  (summary of findings, not a copy of dictation)  CT Head/Face at 2049 10/28: acute subarachnoid hemorrhage with left supratentorial subdural hematoma measuring 3 mm. Trace subdural hematoma noted along left tentorium less than 2 mm. IPH in left temporal lobe measuring up to 15 mm. High suspicion of skull base fracture due to pneumocephalus.   [facility] at 2304 10/28: There is interval worsening of the large acute intraparenchymal hemorrhage in the L tmeporal lobe from 1.5 to 4 cm. There is associated equivocal mass effect of the L cerebral jose de jesus on the L midbrain.  Re-demonstrated acute SAH. Interval worsening of the L subdural hematoma along the L tentorium measuring up to 0.4 cm.   CT C-Spine: no traumatic findings  CT Chest/Abd/Pelvis: no cardiopulmonary or traumatic findings  CXR/PXR: no traumatic findings    LABS:  Results from  last 7 days   Lab Units 10/28/24  2109   WBC AUTO x10*3/uL 9.6   HEMOGLOBIN g/dL 12.6   HEMATOCRIT % 39.1   PLATELETS AUTO x10*3/uL 202   NEUTROS PCT AUTO % 47.4   LYMPHS PCT AUTO % 45.0   MONOS PCT AUTO % 4.8   EOS PCT AUTO % 1.3     Results from last 7 days   Lab Units 10/28/24  2109   INR  1.0     Results from last 7 days   Lab Units 10/28/24  2109   SODIUM mmol/L 133*   POTASSIUM mmol/L 3.3*   CHLORIDE mmol/L 102   CO2 mmol/L 20*   BUN mg/dL 14   CREATININE mg/dL 0.79   CALCIUM mg/dL 8.8   PROTEIN TOTAL g/dL 7.1   BILIRUBIN TOTAL mg/dL 0.4   ALK PHOS U/L 60   ALT U/L 26   AST U/L 36   GLUCOSE mg/dL 160*     Results from last 7 days   Lab Units 10/28/24  2109   BILIRUBIN TOTAL mg/dL 0.4           I have reviewed all laboratory and imaging results ordered/pertinent for this encounter.

## 2024-10-29 NOTE — PROGRESS NOTES
Pharmacy Medication History Review    Pia Nguyen is a 69 y.o. female admitted for SAH (subarachnoid hemorrhage) (Multi). Pharmacy reviewed the patient's [id number] medications and allergies for accuracy.    The list below reflects the updated PTA list.   Prior to Admission Medications   Prescriptions Last Dose Informant   albuterol 90 mcg/actuation inhaler Unknown Family Member   Sig: Inhale 2 puffs every 4 hours if needed.   aspirin 81 mg EC tablet 10/28/2024 Morning Family Member   Sig: Take 1 tablet (81 mg) by mouth once daily.   azelastine (Optivar) 0.05 % ophthalmic solution 10/28/2024 Family Member   Sig: Administer 1 drop into both eyes once daily.   benzonatate (Tessalon) 100 mg capsule Unknown Family Member   Sig: Take 1 capsule (100 mg) by mouth 3 times a day as needed for cough.   calcium carbonate (Tums) 200 mg calcium chewable tablet Unknown Family Member   Sig: Chew 1 tablet (500 mg) once daily as needed for indigestion or heartburn.   carvedilol (Coreg) 6.25 mg tablet 10/28/2024 Family Member   Sig: Take 1 tablet (6.25 mg) by mouth 2 times daily (morning and late afternoon).   clopidogrel (Plavix) 75 mg tablet Unknown Other   Sig: Take 1 tablet (75 mg) by mouth once daily.   ibandronate (Boniva) 150 mg tablet 10/28/2024 Family Member   Sig: Take 1 tablet (150 mg) by mouth every 30 (thirty) days.   levothyroxine (Synthroid, Levoxyl) 100 mcg tablet Unknown Other   Sig: Take 1 tablet (100 mcg) by mouth early in the morning..   multivitamin tablet 10/28/2024 Family Member   Sig: Take 1 tablet by mouth once daily.   omega 3-dha-epa-fish oil (Fish OiL) 1,000 (120-180) mg capsule 10/28/2024 Family Member   Sig: Take 1 capsule (1,000 mg) by mouth once daily.   omeprazole (PriLOSEC) 20 mg DR capsule 10/28/2024 Family Member   Sig: Take 2 capsules (40 mg) by mouth once daily in the morning.   rosuvastatin (Crestor) 40 mg tablet 10/28/2024 Family Member   Sig: Take 1 tablet (40 mg) by mouth once daily  "in the morning. Take before meals.   sertraline (Zoloft) 50 mg tablet Unknown Other   Sig: Take 1.5 tablets (75 mg) by mouth once daily.      Facility-Administered Medications: None        The list below reflects the updated allergy list. Please review each documented allergy for additional clarification and justification.  Allergies  Reviewed by Zenia Aragon RN on 10/28/2024   No Known Allergies         Patient declines M2B at discharge.     Sources:   Family Interview - at bedside, moderate historians, had patient's medication bottles on hand and bale to verify some details of patient's medication history   Care Everywhere [facility] Clinical Summary generated 10/28/24  OARRS - none  EPIC medication dispense report    Medications ADDED:  Al medications above added to chart  Medications CHANGED:  None  Medications REMOVED/MARKED NOT TAKING:   None     Additional Comments:  Patient had up to date fills for the following medications, however were not included in medication bottles brought in by family & family states unsure if patient is still taking  Levothyroxine 100mcg - 1 tab Qday  Sertraline 50mg - 1.5 tabs Qday  Clopidogrel 75mg - 1 tab Qday      Marya Vasquez, PharmD  Transitions of Care Pharmacist  10/29/24     Secure Chat preferred   If no response call [id number] or Vocera \"Med Rec\"    "

## 2024-10-29 NOTE — CONSULTS
Division of Plastic and Reconstructive Surgery  Consult Note    Patient Name: Pia Nguyen  MRN: [id number]  Date:  10/29/24     HPI   Pia Nguyen is a 69 y.o. female with a past medical history of HTN, HLD, PAD s/p L fem and popliteal artery angioplasty 7/2023 on ASA/PLX, asthma, COPD, hypothyroidism, and depression who presented to [facility] as an OSH transfer from [facility] for further management of multiple intracranial bleeds s/p intoxicated fall down approximately 6 stairs. Non-contrasted CT maxillofacial CT imaging obtained upon presentation which revealed nondisplaced bony lucencies along the roof of ethmoid air cells bilaterally with a small amount of pneumocephalus in adjacent left subfrontal location suspicious for underlying nondisplaced fractures. Patient presently admitted to the trauma ICU with NSGY following for intracranial bleeds. Plastic surgery service consulted for evaluation of patient's facial fractures.    No past medical history on file.  No past surgical history on file.  No Known Allergies    Current Facility-Administered Medications:     albuterol 90 mcg/actuation inhaler 2 puff, 2 puff, inhalation, q4h PRN, REX Alvarez-CNP    ampicillin-sulbactam (Unasyn) in sodium chloride 0.9 % 100 mL 3 g, 3 g, intravenous, q6h, CANDIDA Alvarez, Stopped at 10/29/24 0946    carvedilol (Coreg) tablet 12.5 mg, 12.5 mg, oral, BID, Benji Saavedra MD, 12.5 mg at 10/29/24 0840    folic acid (Folvite) tablet 0.4 mg, 0.4 mg, oral, Daily, Michelle Ozuna MD    hydrALAZINE (Apresoline) injection 10 mg, 10 mg, intravenous, q4h PRN, Benji Saavedra MD, 10 mg at 10/29/24 0615    levETIRAcetam (Keppra) 500 mg in sodium chloride (iso)  mL, 500 mg, intravenous, q12h, Benji Saavedra MD, Stopped at 10/29/24 0913    levothyroxine (Synthroid, Levoxyl) tablet 100 mcg, 100 mcg, oral, Daily, CANDIDA Alvarez, 100 mcg at 10/29/24 0840    oxygen (O2) therapy, , inhalation,  Continuous PRN - O2/gases, Benji Saavedra MD    pantoprazole (ProtoNix) EC tablet 40 mg, 40 mg, oral, Daily before breakfast, CANDIDA Alvarez    rosuvastatin (Crestor) tablet 40 mg, 40 mg, oral, Nightly, Benji Saavedra MD    sertraline (Zoloft) tablet 75 mg, 75 mg, oral, Daily, CANDIDA Alvarez, 75 mg at 10/29/24 0840    sodium chloride 0.9% infusion, 75 mL/hr, intravenous, Continuous, CANDIDA Alvarez, Last Rate: 75 mL/hr at 10/29/24 0746, 75 mL/hr at 10/29/24 0746    thiamine (Vitamin B-1) tablet 100 mg, 100 mg, oral, Daily, Michelle Ozuna MD   No family history on file.     Review of Systems   Unable to perform ROS: Mental status change      Objective   /63   Pulse 87   Temp 36.9 °C (98.4 °F) (Temporal)   Resp 18   SpO2 97%      Physical Exam   Constitutional: A&O x1 to self, disoriented to time and place.  Eyes: PERRLA, EOMI, clear sclera, vision grossly intact bilaterally. No orbital rim step off or irregularities appreciated.   ENMT: External ear grossly normal bilaterally, hearing grossly intact. No gross external nasal deformity appreciated. B/l nasal passages patent, with dried blood, no active epistaxis or septal hematoma. No tenderness to nasal bridge. Moist mucous membranes, no apparent injuries or lesions. No intraoral lacs or bleeding appreciated. No newly fractured or absent dentition.   Head/Neck: Neck supple.   Cardiovascular: Normal rate and regular rhythm. 2+ equal pulses of the distal extremities.  Respiratory/Thorax: Breathing comfortably with regular respirations on supplemental O2 via NC. Good symmetric chest expansion.   Gastrointestinal: Abdomen soft, non-distended.    Extremities: Moves all 4 extremities actively, strength appropriate.   Neurological: A&Ox1. Facial motor function and sensation intact, no deficits appreciated.   Psychological: Avoidant behavior.   Skin: Warm and dry.     Diagnostics   Results for orders placed or performed during the  hospital encounter of 10/28/24 (from the past 24 hours)   Blood Gas Venous Full Panel Unsolicited   Result Value Ref Range    POCT pH, Venous 7.25 (LL) 7.33 - 7.43 pH    POCT pCO2, Venous 48 41 - 51 mm Hg    POCT pO2, Venous 58 (H) 35 - 45 mm Hg    POCT SO2, Venous 87 (H) 45 - 75 %    POCT Oxy Hemoglobin, Venous 84.8 (H) 45.0 - 75.0 %    POCT Hematocrit Calculated, Venous 36.0 36.0 - 46.0 %    POCT Sodium, Venous 134 (L) 136 - 145 mmol/L    POCT Potassium, Venous 3.4 (L) 3.5 - 5.3 mmol/L    POCT Chloride, Venous 103 98 - 107 mmol/L    POCT Ionized Calicum, Venous 1.06 (L) 1.10 - 1.33 mmol/L    POCT Glucose, Venous 156 (H) 74 - 99 mg/dL    POCT Lactate, Venous 2.1 (H) 0.4 - 2.0 mmol/L    POCT Base Excess, Venous -6.3 (L) -2.0 - 3.0 mmol/L    POCT HCO3 Calculated, Venous 21.0 (L) 22.0 - 26.0 mmol/L    POCT Hemoglobin, Venous 12.1 12.0 - 16.0 g/dL    POCT Anion Gap, Venous 13.0 10.0 - 25.0 mmol/L    Patient Temperature 37.0 degrees Celsius   CBC and Auto Differential   Result Value Ref Range    WBC 13.1 (H) 4.4 - 11.3 x10*3/uL    nRBC 0.0 0.0 - 0.0 /100 WBCs    RBC 4.24 4.00 - 5.20 x10*6/uL    Hemoglobin 11.9 (L) 12.0 - 16.0 g/dL    Hematocrit 35.3 (L) 36.0 - 46.0 %    MCV 83 80 - 100 fL    MCH 28.1 26.0 - 34.0 pg    MCHC 33.7 32.0 - 36.0 g/dL    RDW 12.4 11.5 - 14.5 %    Platelets 195 150 - 450 x10*3/uL    Neutrophils % 76.5 40.0 - 80.0 %    Immature Granulocytes %, Automated 1.0 (H) 0.0 - 0.9 %    Lymphocytes % 18.5 13.0 - 44.0 %    Monocytes % 2.8 2.0 - 10.0 %    Eosinophils % 0.7 0.0 - 6.0 %    Basophils % 0.5 0.0 - 2.0 %    Neutrophils Absolute 10.02 (H) 1.60 - 5.50 x10*3/uL    Immature Granulocytes Absolute, Automated 0.13 0.00 - 0.50 x10*3/uL    Lymphocytes Absolute 2.42 0.80 - 3.00 x10*3/uL    Monocytes Absolute 0.37 0.05 - 0.80 x10*3/uL    Eosinophils Absolute 0.09 0.00 - 0.40 x10*3/uL    Basophils Absolute 0.07 0.00 - 0.10 x10*3/uL   Comprehensive Metabolic Panel   Result Value Ref Range    Glucose 163 (H) 74  - 99 mg/dL    Sodium 132 (L) 136 - 145 mmol/L    Potassium 3.4 (L) 3.5 - 5.3 mmol/L    Chloride 99 98 - 107 mmol/L    Bicarbonate 20 (L) 21 - 32 mmol/L    Anion Gap 16 10 - 20 mmol/L    Urea Nitrogen 13 6 - 23 mg/dL    Creatinine 0.65 0.50 - 1.05 mg/dL    eGFR >90 >60 mL/min/1.73m*2    Calcium 8.2 (L) 8.6 - 10.6 mg/dL    Albumin 4.4 3.4 - 5.0 g/dL    Alkaline Phosphatase 56 33 - 136 U/L    Total Protein 7.1 6.4 - 8.2 g/dL    AST 36 9 - 39 U/L    Bilirubin, Total 0.4 0.0 - 1.2 mg/dL    ALT 24 7 - 45 U/L   Alcohol   Result Value Ref Range    Alcohol 174 (H) <=10 mg/dL   Lactate   Result Value Ref Range    Lactate 2.2 (H) 0.4 - 2.0 mmol/L   Protime-INR   Result Value Ref Range    Protime 10.5 9.8 - 12.8 seconds    INR 0.9 0.9 - 1.1   Type And Screen   Result Value Ref Range    ABO TYPE A     Rh TYPE POS     ANTIBODY SCREEN NEG    VERIFY ABO/Rh Group Test   Result Value Ref Range    ABO TYPE A     Rh TYPE POS    ECG 12 lead   Result Value Ref Range    Ventricular Rate 90 BPM    Atrial Rate 90 BPM    NE Interval 200 ms    QRS Duration 94 ms    QT Interval 402 ms    QTC Calculation(Bazett) 491 ms    P Axis 55 degrees    R Axis 92 degrees    T Axis 29 degrees    QRS Count 15 beats    Q Onset 210 ms    P Onset 110 ms    P Offset 161 ms    T Offset 411 ms    QTC Fredericia 460 ms   Fibrinogen   Result Value Ref Range    Fibrinogen 266 200 - 400 mg/dL   TEG CLOT GLOBAL PROFILE   Result Value Ref Range    R (Reaction Time) K 6.4 4.6 - 9.1 min    K (Clot Kinetics) 1.5 0.8 - 2.1 min    ANGLE 70.4 63.0 - 78.0 deg    MA (Max Amplitude) K 58.5 52.0 - 69.0 mm    R (Reaction Time) KH 6.1 4.3 - 8.3 min    MA (Max Amplitude) RT 59.0 52.0 - 70.0 mm    MA ( Candi Amplitude) FF 16.2 15.0 - 32.0 mm    FLEV 296 278 - 581 mg/dL   CBC   Result Value Ref Range    WBC 16.1 (H) 4.4 - 11.3 x10*3/uL    nRBC 0.0 0.0 - 0.0 /100 WBCs    RBC 4.42 4.00 - 5.20 x10*6/uL    Hemoglobin 12.4 12.0 - 16.0 g/dL    Hematocrit 37.2 36.0 - 46.0 %    MCV 84 80 - 100  fL    MCH 28.1 26.0 - 34.0 pg    MCHC 33.3 32.0 - 36.0 g/dL    RDW 12.3 11.5 - 14.5 %    Platelets 189 150 - 450 x10*3/uL   Basic Metabolic Panel   Result Value Ref Range    Glucose 159 (H) 74 - 99 mg/dL    Sodium 131 (L) 136 - 145 mmol/L    Potassium 3.6 3.5 - 5.3 mmol/L    Chloride 98 98 - 107 mmol/L    Bicarbonate 19 (L) 21 - 32 mmol/L    Anion Gap 18 10 - 20 mmol/L    Urea Nitrogen 12 6 - 23 mg/dL    Creatinine 0.63 0.50 - 1.05 mg/dL    eGFR >90 >60 mL/min/1.73m*2    Calcium 8.2 (L) 8.6 - 10.6 mg/dL   aPTT   Result Value Ref Range    aPTT 27 27 - 38 seconds   Magnesium   Result Value Ref Range    Magnesium 1.81 1.60 - 2.40 mg/dL   Phosphorus   Result Value Ref Range    Phosphorus 3.7 2.5 - 4.9 mg/dL   Lactate   Result Value Ref Range    Lactate 2.3 (H) 0.4 - 2.0 mmol/L   BLOOD GAS VENOUS FULL PANEL   Result Value Ref Range    POCT pH, Venous 7.26 (L) 7.33 - 7.43 pH    POCT pCO2, Venous 49 41 - 51 mm Hg    POCT pO2, Venous 71 (H) 35 - 45 mm Hg    POCT SO2, Venous 94 (H) 45 - 75 %    POCT Oxy Hemoglobin, Venous 91.4 (H) 45.0 - 75.0 %    POCT Hematocrit Calculated, Venous 38.0 36.0 - 46.0 %    POCT Sodium, Venous 133 (L) 136 - 145 mmol/L    POCT Potassium, Venous 3.3 (L) 3.5 - 5.3 mmol/L    POCT Chloride, Venous 99 98 - 107 mmol/L    POCT Ionized Calicum, Venous 1.10 1.10 - 1.33 mmol/L    POCT Glucose, Venous 157 (H) 74 - 99 mg/dL    POCT Lactate, Venous 2.3 (H) 0.4 - 2.0 mmol/L    POCT Base Excess, Venous -5.3 (L) -2.0 - 3.0 mmol/L    POCT HCO3 Calculated, Venous 22.0 22.0 - 26.0 mmol/L    POCT Hemoglobin, Venous 12.7 12.0 - 16.0 g/dL    POCT Anion Gap, Venous 15.0 10.0 - 25.0 mmol/L    Patient Temperature 37.0 degrees Celsius    FiO2 28 %   POCT GLUCOSE   Result Value Ref Range    POCT Glucose 145 (H) 74 - 99 mg/dL   Calcium, ionized   Result Value Ref Range    POCT Calcium, Ionized 1.07 (L) 1.1 - 1.33 mmol/L   TEG Clot Global Profile   Result Value Ref Range    R (Reaction Time) K 3.7 (L) 4.6 - 9.1 min    K  (Clot Kinetics) 1.6 0.8 - 2.1 min    ANGLE 70.1 63.0 - 78.0 deg    MA (Max Amplitude) K 57.9 52.0 - 69.0 mm    R (Reaction Time) KH 3.7 (L) 4.3 - 8.3 min    MA (Max Amplitude) RT 56.9 52.0 - 70.0 mm    MA ( Candi Amplitude) FF 14.6 (L) 15.0 - 32.0 mm    FLEV 266 (L) 278 - 581 mg/dL   CBC   Result Value Ref Range    WBC 16.1 (H) 4.4 - 11.3 x10*3/uL    nRBC 0.0 0.0 - 0.0 /100 WBCs    RBC 4.66 4.00 - 5.20 x10*6/uL    Hemoglobin 12.9 12.0 - 16.0 g/dL    Hematocrit 39.9 36.0 - 46.0 %    MCV 86 80 - 100 fL    MCH 27.7 26.0 - 34.0 pg    MCHC 32.3 32.0 - 36.0 g/dL    RDW 12.6 11.5 - 14.5 %    Platelets 229 150 - 450 x10*3/uL   Renal function panel   Result Value Ref Range    Glucose 136 (H) 74 - 99 mg/dL    Sodium 134 (L) 136 - 145 mmol/L    Potassium 3.4 (L) 3.5 - 5.3 mmol/L    Chloride 97 (L) 98 - 107 mmol/L    Bicarbonate 21 21 - 32 mmol/L    Anion Gap 19 10 - 20 mmol/L    Urea Nitrogen 11 6 - 23 mg/dL    Creatinine 0.54 0.50 - 1.05 mg/dL    eGFR >90 >60 mL/min/1.73m*2    Calcium 8.4 (L) 8.6 - 10.6 mg/dL    Phosphorus 3.9 2.5 - 4.9 mg/dL    Albumin 4.7 3.4 - 5.0 g/dL   POCT GLUCOSE   Result Value Ref Range    POCT Glucose 158 (H) 74 - 99 mg/dL   POCT GLUCOSE   Result Value Ref Range    POCT Glucose 134 (H) 74 - 99 mg/dL   Lactate   Result Value Ref Range    Lactate 1.2 0.4 - 2.0 mmol/L   Renal Function Panel   Result Value Ref Range    Glucose 132 (H) 74 - 99 mg/dL    Sodium 134 (L) 136 - 145 mmol/L    Potassium 4.7 3.5 - 5.3 mmol/L    Chloride 99 98 - 107 mmol/L    Bicarbonate 22 21 - 32 mmol/L    Anion Gap 18 10 - 20 mmol/L    Urea Nitrogen 8 6 - 23 mg/dL    Creatinine 0.52 0.50 - 1.05 mg/dL    eGFR >90 >60 mL/min/1.73m*2    Calcium 8.7 8.6 - 10.6 mg/dL    Phosphorus 3.6 2.5 - 4.9 mg/dL    Albumin 4.4 3.4 - 5.0 g/dL     CT maxillofacial bones wo IV contrast  Result Date: 10/29/2024  Interpreted By:  Jonathan Rebolledo, STUDY: CT HEAD WO IV CONTRAST; CT FACIAL BONES WO IV CONTRAST;  10/29/2024 5:38 am   INDICATION:  Signs/Symptoms:stability scan; Signs/Symptoms:pneumocephalus, c/f skull fx.   COMPARISON: 10/20/2024 time 11:03 p.m.   ACCESSION NUMBER(S): [id number]; [id number]   ORDERING CLINICIAN: JEROME ALVARADO   TECHNIQUE: Axial CT images of the head were obtained without intravenous contrast administration.   In addition, thin cut axial CT images through the facial bones were obtained and reconstructed in the coronal and sagittal planes.   FINDINGS: The CT of the head again demonstrates areas of hyperdense intraparenchymal hemorrhage centered within left temporal lobe similar in size and configuration when compared with the prior study dated 10/20/2024 time 11:03 p.m.. There is similar surrounding edema and mass effect contributing to asymmetric effacement of surrounding sulci and partial effacement of the adjacent left lateral ventricle.   There is again evidence of similar extra-axial hyperdense hemorrhage most pronounced surrounding the left temporal lobe which may be subarachnoid and/or subdural in location. There is again evidence of mildly prominent hyperdensity along the tentorium left greater than right suggestive of subdural hemorrhage.   Additional hyperdense subarachnoid hemorrhage is again identified along sulci of the cerebral hemispheres bilaterally left greater than right similar when compared with the prior exam.   There is again evidence of hyperdense intraventricular hemorrhage layering within the lateral ventricles mildly improved within the left lateral ventricle when compared with 10/20/2024.   A small amount of residual but improved pneumocephalus is noted surrounding the left frontal lobe most pronounced in a left subfrontal location.   The ventricular system remains nondilated without evidence of hydrocephalus.   There is no significant midline shift.   Atherosclerotic calcifications are noted along the carotid siphons.   The CT of the facial bones demonstrates nondisplaced bony  lucencies along the roof of ethmoid air cells bilaterally with a small amount of pneumocephalus in adjacent left subfrontal location suspicious for underlying nondisplaced fractures.   There is opacification and partial opacification of scattered ethmoid air cells. There is an abnormal air/fluid level and mild mucosal thickening noted within the right sphenoid sinus. There is mild lobulated mucosal thickening within the inferior maxillary sinuses bilaterally. There is leftward nasal septal deviation. There is partial paradoxical rotation of the middle turbinates bilaterally.   The orbits are intact.   There is poor dentition with the patient missing several teeth. There is periapical lucency surrounding the residual right lower molar suggestive of underlying periodontal disease.   The bilateral middle ear cavities and mastoid air cells are clear.   There are degenerative changes involving the temporomandibular joints right greater than left.   There is extracranial soft tissue swelling noted within the right scalp and face likely posttraumatic in etiology.   Atherosclerotic calcifications are noted along the carotid bifurcations bilaterally.   The limited images through the upper cervical spine demonstrate multilevel cervical spondylosis.       The CT of the head again demonstrates areas of hyperdense intraparenchymal hemorrhage centered within left temporal lobe similar in size and configuration when compared with the prior study dated 10/20/2024 time 11:03 p.m.. There is similar surrounding edema and mass effect contributing to asymmetric effacement of surrounding sulci and partial effacement of the adjacent left lateral ventricle.   There is again evidence of similar extra-axial hyperdense hemorrhage most pronounced surrounding the left temporal lobe which may be subarachnoid and/or subdural in location. There is again evidence of mildly prominent hyperdensity along the tentorium left greater than right  suggestive of subdural hemorrhage.   Additional hyperdense subarachnoid hemorrhage is again identified along sulci of the cerebral hemispheres bilaterally left greater than right similar when compared with the prior exam.   There is again evidence of hyperdense intraventricular hemorrhage layering within the lateral ventricles mildly improved within the left lateral ventricle when compared with 10/20/2024.   A small amount of residual but improved pneumocephalus is noted surrounding the left frontal lobe most pronounced in a left subfrontal location.   The ventricular system remains nondilated without evidence of hydrocephalus.   The CT of the facial bones demonstrates nondisplaced bony lucencies along the roof of ethmoid air cells bilaterally with a small amount of pneumocephalus in adjacent left subfrontal location suspicious for underlying nondisplaced fractures.   There is opacification and partial opacification of scattered ethmoid air cells. There is an abnormal air/fluid level and mild mucosal thickening noted within the right sphenoid sinus. There is mild lobulated mucosal thickening within the inferior maxillary sinuses bilaterally.   There is extracranial soft tissue swelling noted within the right scalp and face likely posttraumatic in etiology.   MACRO: None.   Signed by: Jonathan Rebolledo 10/29/2024 6:03 AM Dictation workstation:   [id number]    Current Medications  Scheduled medications  ampicillin-sulbactam, 3 g, intravenous, q6h  carvedilol, 12.5 mg, oral, BID  folic acid, 0.4 mg, oral, Daily  levETIRAcetam, 500 mg, intravenous, q12h  levothyroxine, 100 mcg, oral, Daily  pantoprazole, 40 mg, oral, Daily before breakfast  rosuvastatin, 40 mg, oral, Nightly  sertraline, 75 mg, oral, Daily  thiamine, 100 mg, oral, Daily      Continuous medications  sodium chloride 0.9%, 75 mL/hr, Last Rate: 75 mL/hr (10/29/24 0746)      PRN medications  PRN medications: albuterol, hydrALAZINE, oxygen     Assessment   Pia SERRANO  Patrick is a 69 y.o. female with a past medical history of HTN, HLD, PAD s/p L fem and popliteal artery angioplasty 7/2023 on ASA/PLX, asthma, COPD, hypothyroidism, and depression who presented to [facility] as an OSH transfer from [facility] for further management of multiple intracranial bleeds s/p intoxicated fall down approximately 6 stairs. Non-contrasted CT maxillofacial CT imaging obtained upon presentation which revealed nondisplaced bony lucencies along the roof of ethmoid air cells bilaterally with a small amount of pneumocephalus in adjacent left subfrontal location suspicious for underlying nondisplaced fractures. Patient presently admitted to the trauma ICU with NSGY following for intracranial bleeds. Plastic surgery service consulted for evaluation of patient's facial fractures.    Plan/Recommendations  - No acute surgical interventions planned from plastic surgery perspective, facial fractures do not appear to require operative repair, felt to heal independently with conservative management over time   - Continue HOB elevation for alleviation of facial edema  - May utilize cold pack PRN for facial discomfort but avoid pressure and ensure barrier with skin  - Patient should maintain sinus precautions for 4-6 weeks post injury which include:       ·  Keeping head of bed elevated at all times, greater than 30 degrees       ·  No blowing nose       ·  No forcibly spitting or smoking        ·  No use of straws to drink       ·  Keep mouth open when coughing       ·  No lifting greater than 15-20 pounds   - Appreciate remaining supportive care per primary service  - OK for DC from plastic surgery perspective regarding facial fractures and abrasion, final disposition per primary team when medically clear  - Patient may follow up with plastic surgery outpatient on PRN basis, no scheduled follow up required   - Appreciate involvement in patient care, plastic surgery service will sign off, please reach out  with any further questions/concerns     Patient and plan discussed with Dr. Betancourt.     Mary Salas PA-C  Plastic and Reconstructive Surgery   [place]  Pager #[phone]  Team phones: [phone]

## 2024-10-30 ENCOUNTER — APPOINTMENT (OUTPATIENT)
Dept: RADIOLOGY | Facility: HOSPITAL | Age: 69
DRG: 083 | End: 2024-10-30
Payer: MEDICARE

## 2024-10-30 LAB
ALBUMIN SERPL BCP-MCNC: 3.8 G/DL (ref 3.4–5)
ANION GAP SERPL CALC-SCNC: 11 MMOL/L (ref 10–20)
ANION GAP SERPL CALC-SCNC: 12 MMOL/L (ref 10–20)
BUN SERPL-MCNC: 8 MG/DL (ref 6–23)
BUN SERPL-MCNC: 9 MG/DL (ref 6–23)
CA-I BLD-SCNC: 1.1 MMOL/L (ref 1.1–1.33)
CALCIUM SERPL-MCNC: 8 MG/DL (ref 8.6–10.6)
CALCIUM SERPL-MCNC: 8.1 MG/DL (ref 8.6–10.6)
CARDIAC TROPONIN I PNL SERPL HS: 9 NG/L (ref 0–34)
CHLORIDE SERPL-SCNC: 100 MMOL/L (ref 98–107)
CHLORIDE SERPL-SCNC: 98 MMOL/L (ref 98–107)
CK SERPL-CCNC: 274 U/L (ref 0–215)
CK SERPL-CCNC: 333 U/L (ref 0–215)
CO2 SERPL-SCNC: 24 MMOL/L (ref 21–32)
CO2 SERPL-SCNC: 25 MMOL/L (ref 21–32)
CREAT SERPL-MCNC: 0.44 MG/DL (ref 0.5–1.05)
CREAT SERPL-MCNC: 0.49 MG/DL (ref 0.5–1.05)
EGFRCR SERPLBLD CKD-EPI 2021: >90 ML/MIN/1.73M*2
EGFRCR SERPLBLD CKD-EPI 2021: >90 ML/MIN/1.73M*2
ERYTHROCYTE [DISTWIDTH] IN BLOOD BY AUTOMATED COUNT: 12.7 % (ref 11.5–14.5)
GLUCOSE SERPL-MCNC: 129 MG/DL (ref 74–99)
GLUCOSE SERPL-MCNC: 135 MG/DL (ref 74–99)
HCT VFR BLD AUTO: 32.4 % (ref 36–46)
HGB BLD-MCNC: 10.5 G/DL (ref 12–16)
MAGNESIUM SERPL-MCNC: 2.08 MG/DL (ref 1.6–2.4)
MCH RBC QN AUTO: 27.3 PG (ref 26–34)
MCHC RBC AUTO-ENTMCNC: 32.4 G/DL (ref 32–36)
MCV RBC AUTO: 84 FL (ref 80–100)
NRBC BLD-RTO: 0 /100 WBCS (ref 0–0)
PHOSPHATE SERPL-MCNC: 2.7 MG/DL (ref 2.5–4.9)
PLATELET # BLD AUTO: 164 X10*3/UL (ref 150–450)
POTASSIUM SERPL-SCNC: 3.7 MMOL/L (ref 3.5–5.3)
POTASSIUM SERPL-SCNC: 3.8 MMOL/L (ref 3.5–5.3)
RBC # BLD AUTO: 3.84 X10*6/UL (ref 4–5.2)
SODIUM SERPL-SCNC: 130 MMOL/L (ref 136–145)
SODIUM SERPL-SCNC: 132 MMOL/L (ref 136–145)
WBC # BLD AUTO: 9 X10*3/UL (ref 4.4–11.3)

## 2024-10-30 PROCEDURE — 85027 COMPLETE CBC AUTOMATED: CPT

## 2024-10-30 PROCEDURE — 2500000001 HC RX 250 WO HCPCS SELF ADMINISTERED DRUGS (ALT 637 FOR MEDICARE OP): Performed by: PHYSICIAN ASSISTANT

## 2024-10-30 PROCEDURE — 2500000001 HC RX 250 WO HCPCS SELF ADMINISTERED DRUGS (ALT 637 FOR MEDICARE OP): Performed by: STUDENT IN AN ORGANIZED HEALTH CARE EDUCATION/TRAINING PROGRAM

## 2024-10-30 PROCEDURE — 2500000002 HC RX 250 W HCPCS SELF ADMINISTERED DRUGS (ALT 637 FOR MEDICARE OP, ALT 636 FOR OP/ED): Performed by: NURSE PRACTITIONER

## 2024-10-30 PROCEDURE — 1100000001 HC PRIVATE ROOM DAILY

## 2024-10-30 PROCEDURE — 2500000005 HC RX 250 GENERAL PHARMACY W/O HCPCS: Performed by: PHYSICIAN ASSISTANT

## 2024-10-30 PROCEDURE — 82550 ASSAY OF CK (CPK): CPT | Performed by: STUDENT IN AN ORGANIZED HEALTH CARE EDUCATION/TRAINING PROGRAM

## 2024-10-30 PROCEDURE — 2500000004 HC RX 250 GENERAL PHARMACY W/ HCPCS (ALT 636 FOR OP/ED): Performed by: NURSE PRACTITIONER

## 2024-10-30 PROCEDURE — 2500000001 HC RX 250 WO HCPCS SELF ADMINISTERED DRUGS (ALT 637 FOR MEDICARE OP): Performed by: NURSE PRACTITIONER

## 2024-10-30 PROCEDURE — 97530 THERAPEUTIC ACTIVITIES: CPT | Mod: GO

## 2024-10-30 PROCEDURE — 70450 CT HEAD/BRAIN W/O DYE: CPT | Performed by: RADIOLOGY

## 2024-10-30 PROCEDURE — 99233 SBSQ HOSP IP/OBS HIGH 50: CPT | Performed by: STUDENT IN AN ORGANIZED HEALTH CARE EDUCATION/TRAINING PROGRAM

## 2024-10-30 PROCEDURE — 97535 SELF CARE MNGMENT TRAINING: CPT | Mod: GO

## 2024-10-30 PROCEDURE — 97530 THERAPEUTIC ACTIVITIES: CPT | Mod: GP | Performed by: PHYSICAL THERAPIST

## 2024-10-30 PROCEDURE — 2500000004 HC RX 250 GENERAL PHARMACY W/ HCPCS (ALT 636 FOR OP/ED): Performed by: STUDENT IN AN ORGANIZED HEALTH CARE EDUCATION/TRAINING PROGRAM

## 2024-10-30 PROCEDURE — 80069 RENAL FUNCTION PANEL: CPT

## 2024-10-30 PROCEDURE — 99222 1ST HOSP IP/OBS MODERATE 55: CPT | Performed by: PHYSICAL MEDICINE & REHABILITATION

## 2024-10-30 PROCEDURE — 84484 ASSAY OF TROPONIN QUANT: CPT | Performed by: STUDENT IN AN ORGANIZED HEALTH CARE EDUCATION/TRAINING PROGRAM

## 2024-10-30 PROCEDURE — 36415 COLL VENOUS BLD VENIPUNCTURE: CPT | Performed by: NURSE PRACTITIONER

## 2024-10-30 PROCEDURE — 2500000001 HC RX 250 WO HCPCS SELF ADMINISTERED DRUGS (ALT 637 FOR MEDICARE OP)

## 2024-10-30 PROCEDURE — 70450 CT HEAD/BRAIN W/O DYE: CPT

## 2024-10-30 PROCEDURE — 36415 COLL VENOUS BLD VENIPUNCTURE: CPT | Performed by: STUDENT IN AN ORGANIZED HEALTH CARE EDUCATION/TRAINING PROGRAM

## 2024-10-30 PROCEDURE — 80048 BASIC METABOLIC PNL TOTAL CA: CPT | Mod: CCI | Performed by: NURSE PRACTITIONER

## 2024-10-30 PROCEDURE — 82550 ASSAY OF CK (CPK): CPT | Performed by: NURSE PRACTITIONER

## 2024-10-30 PROCEDURE — 2500000004 HC RX 250 GENERAL PHARMACY W/ HCPCS (ALT 636 FOR OP/ED)

## 2024-10-30 PROCEDURE — 83735 ASSAY OF MAGNESIUM: CPT | Performed by: NURSE PRACTITIONER

## 2024-10-30 PROCEDURE — 99223 1ST HOSP IP/OBS HIGH 75: CPT | Performed by: NURSE PRACTITIONER

## 2024-10-30 PROCEDURE — 82330 ASSAY OF CALCIUM: CPT | Performed by: NURSE PRACTITIONER

## 2024-10-30 RX ORDER — HYDROMORPHONE HYDROCHLORIDE 0.2 MG/ML
0.2 INJECTION INTRAMUSCULAR; INTRAVENOUS; SUBCUTANEOUS
Status: DISCONTINUED | OUTPATIENT
Start: 2024-10-30 | End: 2024-10-30

## 2024-10-30 RX ORDER — TALC
3 POWDER (GRAM) TOPICAL NIGHTLY
Status: DISCONTINUED | OUTPATIENT
Start: 2024-10-30 | End: 2024-11-04 | Stop reason: HOSPADM

## 2024-10-30 RX ORDER — ACETAMINOPHEN 325 MG/1
650 TABLET ORAL EVERY 6 HOURS
Status: DISCONTINUED | OUTPATIENT
Start: 2024-10-30 | End: 2024-10-30

## 2024-10-30 RX ORDER — ACETAMINOPHEN 325 MG/1
650 TABLET ORAL
Status: DISCONTINUED | OUTPATIENT
Start: 2024-10-31 | End: 2024-11-04 | Stop reason: HOSPADM

## 2024-10-30 RX ORDER — OXYCODONE HYDROCHLORIDE 5 MG/1
5 TABLET ORAL EVERY 6 HOURS PRN
Status: DISCONTINUED | OUTPATIENT
Start: 2024-10-30 | End: 2024-10-30

## 2024-10-30 RX ORDER — ENOXAPARIN SODIUM 100 MG/ML
30 INJECTION SUBCUTANEOUS 2 TIMES DAILY
Status: DISCONTINUED | OUTPATIENT
Start: 2024-10-31 | End: 2024-11-04 | Stop reason: HOSPADM

## 2024-10-30 RX ORDER — ONDANSETRON HYDROCHLORIDE 2 MG/ML
4 INJECTION, SOLUTION INTRAVENOUS EVERY 6 HOURS PRN
Status: DISCONTINUED | OUTPATIENT
Start: 2024-10-30 | End: 2024-11-04 | Stop reason: HOSPADM

## 2024-10-30 RX ORDER — BUTALBITAL, ACETAMINOPHEN AND CAFFEINE 50; 325; 40 MG/1; MG/1; MG/1
1 TABLET ORAL EVERY 4 HOURS PRN
Status: DISCONTINUED | OUTPATIENT
Start: 2024-10-30 | End: 2024-11-03

## 2024-10-30 RX ORDER — OXYCODONE HYDROCHLORIDE 5 MG/1
5 TABLET ORAL EVERY 6 HOURS PRN
Status: DISCONTINUED | OUTPATIENT
Start: 2024-10-30 | End: 2024-11-04 | Stop reason: HOSPADM

## 2024-10-30 RX ORDER — LANOLIN ALCOHOL/MO/W.PET/CERES
1000 CREAM (GRAM) TOPICAL DAILY
Status: DISCONTINUED | OUTPATIENT
Start: 2024-10-30 | End: 2024-11-04 | Stop reason: HOSPADM

## 2024-10-30 RX ADMIN — OXYCODONE HYDROCHLORIDE 5 MG: 5 TABLET ORAL at 17:15

## 2024-10-30 RX ADMIN — ACETAMINOPHEN 975 MG: 325 TABLET, FILM COATED ORAL at 04:46

## 2024-10-30 RX ADMIN — SERTRALINE 75 MG: 50 TABLET, FILM COATED ORAL at 09:44

## 2024-10-30 RX ADMIN — ACETAMINOPHEN 650 MG: 325 TABLET ORAL at 10:47

## 2024-10-30 RX ADMIN — CARVEDILOL 12.5 MG: 12.5 TABLET, FILM COATED ORAL at 20:38

## 2024-10-30 RX ADMIN — ROSUVASTATIN CALCIUM 40 MG: 20 TABLET, FILM COATED ORAL at 20:38

## 2024-10-30 RX ADMIN — LEVETIRACETAM 500 MG: 5 INJECTION INTRAVENOUS at 20:38

## 2024-10-30 RX ADMIN — Medication 3 MG: at 20:38

## 2024-10-30 RX ADMIN — AMPICILLIN SODIUM AND SULBACTAM SODIUM 3 G: 2; 1 INJECTION, POWDER, FOR SOLUTION INTRAMUSCULAR; INTRAVENOUS at 00:54

## 2024-10-30 RX ADMIN — ACETAMINOPHEN 650 MG: 325 TABLET ORAL at 16:00

## 2024-10-30 RX ADMIN — CYANOCOBALAMIN TAB 1000 MCG 1000 MCG: 1000 TAB at 20:45

## 2024-10-30 RX ADMIN — Medication 0.4 MG: at 09:44

## 2024-10-30 RX ADMIN — LEVOTHYROXINE SODIUM 100 MCG: 0.1 TABLET ORAL at 06:10

## 2024-10-30 RX ADMIN — THIAMINE HCL TAB 100 MG 100 MG: 100 TAB at 09:44

## 2024-10-30 RX ADMIN — ONDANSETRON 4 MG: 2 INJECTION INTRAMUSCULAR; INTRAVENOUS at 10:55

## 2024-10-30 RX ADMIN — PANTOPRAZOLE SODIUM 40 MG: 40 TABLET, DELAYED RELEASE ORAL at 06:10

## 2024-10-30 RX ADMIN — LEVETIRACETAM 500 MG: 5 INJECTION INTRAVENOUS at 09:44

## 2024-10-30 RX ADMIN — CARVEDILOL 12.5 MG: 12.5 TABLET, FILM COATED ORAL at 09:44

## 2024-10-30 RX ADMIN — AMPICILLIN SODIUM AND SULBACTAM SODIUM 3 G: 2; 1 INJECTION, POWDER, FOR SOLUTION INTRAMUSCULAR; INTRAVENOUS at 07:49

## 2024-10-30 ASSESSMENT — COGNITIVE AND FUNCTIONAL STATUS - GENERAL
EATING MEALS: A LITTLE
STANDING UP FROM CHAIR USING ARMS: A LITTLE
STANDING UP FROM CHAIR USING ARMS: A LITTLE
EATING MEALS: A LITTLE
STANDING UP FROM CHAIR USING ARMS: A LITTLE
CLIMB 3 TO 5 STEPS WITH RAILING: TOTAL
WALKING IN HOSPITAL ROOM: A LITTLE
MOVING TO AND FROM BED TO CHAIR: A LITTLE
MOVING TO AND FROM BED TO CHAIR: A LITTLE
DRESSING REGULAR UPPER BODY CLOTHING: A LITTLE
DAILY ACTIVITIY SCORE: 18
TURNING FROM BACK TO SIDE WHILE IN FLAT BAD: A LITTLE
MOBILITY SCORE: 18
DRESSING REGULAR LOWER BODY CLOTHING: A LITTLE
DAILY ACTIVITIY SCORE: 15
EATING MEALS: A LITTLE
MOVING FROM LYING ON BACK TO SITTING ON SIDE OF FLAT BED WITH BEDRAILS: A LITTLE
WALKING IN HOSPITAL ROOM: TOTAL
PERSONAL GROOMING: A LITTLE
TOILETING: A LITTLE
DRESSING REGULAR UPPER BODY CLOTHING: A LITTLE
MOVING FROM LYING ON BACK TO SITTING ON SIDE OF FLAT BED WITH BEDRAILS: A LITTLE
MOBILITY SCORE: 14
MOBILITY SCORE: 19
DAILY ACTIVITIY SCORE: 18
DRESSING REGULAR LOWER BODY CLOTHING: A LOT
TURNING FROM BACK TO SIDE WHILE IN FLAT BAD: A LITTLE
TOILETING: A LOT
WALKING IN HOSPITAL ROOM: A LITTLE
DRESSING REGULAR LOWER BODY CLOTHING: A LITTLE
PERSONAL GROOMING: A LITTLE
CLIMB 3 TO 5 STEPS WITH RAILING: A LOT
TOILETING: A LITTLE
HELP NEEDED FOR BATHING: A LITTLE
PERSONAL GROOMING: A LITTLE
HELP NEEDED FOR BATHING: A LOT
CLIMB 3 TO 5 STEPS WITH RAILING: A LITTLE
MOVING TO AND FROM BED TO CHAIR: A LITTLE
HELP NEEDED FOR BATHING: A LITTLE
DRESSING REGULAR UPPER BODY CLOTHING: A LITTLE

## 2024-10-30 ASSESSMENT — PAIN - FUNCTIONAL ASSESSMENT
PAIN_FUNCTIONAL_ASSESSMENT: 0-10
PAIN_FUNCTIONAL_ASSESSMENT: CPOT (CRITICAL CARE PAIN OBSERVATION TOOL)
PAIN_FUNCTIONAL_ASSESSMENT: CPOT (CRITICAL CARE PAIN OBSERVATION TOOL)

## 2024-10-30 ASSESSMENT — PAIN SCALES - GENERAL
PAINLEVEL_OUTOF10: 0 - NO PAIN

## 2024-10-30 ASSESSMENT — PAIN SCALES - WONG BAKER
WONGBAKER_NUMERICALRESPONSE: HURTS WHOLE LOT
WONGBAKER_NUMERICALRESPONSE: NO HURT
WONGBAKER_NUMERICALRESPONSE: HURTS WORST

## 2024-10-30 ASSESSMENT — ACTIVITIES OF DAILY LIVING (ADL): HOME_MANAGEMENT_TIME_ENTRY: 15

## 2024-10-30 NOTE — PROGRESS NOTES
[facility]  TRAUMA ICU - PROGRESS NOTE    Patient Name: Pia Nguyen  MRN: [id number]  Admit Date: 1028  : 1955  AGE: 69 y.o.   GENDER: female  ==============================================================================  MECHANISM OF INJURY:   Fall down 5 stairs  LOC (yes/no?): yes, (+) ETOH  Anticoagulant / Anti-platelet Rx? (for what dx?): DAPT for PVD  Referring Facility Name (N/A for scene EMR run): [facility]    INJURIES:   L frontal SAH  Thin L SDH  IPH near L silvian fissure  pneumocephalus    OTHER MEDICAL PROBLEMS:  PVD  HTN  HLD  GERD  COPD/asthma  hypothyroidism    INCIDENTAL FINDINGS:  Bilateral carotid stenosis    PROCEDURES:  none    ==============================================================================  TODAY'S ASSESSMENT AND PLAN OF CARE:  Pia Nguyen is a 69 y.o. female in the ICU due to: frequent neuro checks    NEURO/PAIN/SEDATION:   #Multiple ICH, SAH/SDH/IPH  #history of depression  #ETOH  -Nsgy consulted>hold DAPT, SBP <160, hold DVT ppx until PBD2, Keppra x7d   -3rd rCTH stable at 0500 after worsening on 2nd scan, nsgy recommending scan 10/30 in AM  -TBI precautions, HOB >30, brain rest  -q1h neuro checks  -attempted to clear c-collar x2, initially patient not cooperative, second time at 1430 she endorsed neck pain  with and without movement. Will leave in collar for now with a plan to reevaluate in the morning.   -start home Zoloft  -daily ETOH minimum 3-4 beers. PAWS currently 2, can reevaluate if becomes symptomatic   -syncopal workup started, EKG done, troponin and orthostatics pending   -chaitanya c/s for multiple falls at home  -PMR consult for possible rehab  -PRN oxy/dilaudid for pain  -tylenol scheduled, may need to switch to fioricet if she has headaches     EENT  #ethmoid sinus fx  -Plastics consulted for face-no intervention, non op, no antibiotics     RESPIRATORY:   #PMH COPD/Asthma  - on RA  -Q1h IS   -continue home PRN albuterol      CARDIOVASC:   #PMH HTN, HLD, PAD s/p arthrectomy with left SFA angioplasty 2023  -can likely restart home carvedilol 6.25mg today   -hold home ASA/Plavix    GI:   -regular diet   - prn zofran    :   -no active issues, voiding independently  -HLIV    HEMATOLOGIC:   -no active issues, daily CBC    ENDOCRINE:  #pmh hypothyroidism   - q4 glucose checks while NPO,  well controlled since arrival will discontinue for floor transfer  - continue home Synthroid, 100mcg     MUSCULOSKELETAL/SKIN:   #pmh osteoporosis  -hold home Boniva   -skin precautions per unit standard  - c/o generalized pain, unsure if patient in pain or confused. Will check CPK    INFECTIOUS DISEASE:   -leukocytosis resolved    GI PROPHYLAXIS: continue home omeprazole as Protonix     DVT PROPHYLAXIS: SCDs, can start LVX 48 h post stability scan (10/31 9a)    DISPOSITION: Continue care in Trauma ICU  ==============================================================================  CHIEF COMPLAINT / OVERNIGHT EVENTS / HPI:   Admitted overnight, stable CTH at 5am    MEDICAL HISTORY / ROS:  Admission history and ROS reviewed. Pertinent changes as follows:  N/A    PHYSICAL EXAM:  Heart Rate:  []   Temp:  [35.8 °C (96.4 °F)-36.9 °C (98.4 °F)]   Resp:  [13-25]   BP: ()/()   SpO2:  [87 %-99 %]   Physical Exam  Vitals and nursing note reviewed.   Constitutional:       General: She is not in acute distress.     Appearance: Normal appearance. She is normal weight. She is not ill-appearing or diaphoretic.   HENT:      Head: Normocephalic and atraumatic.      Mouth/Throat:      Mouth: Mucous membranes are moist.      Pharynx: Oropharynx is clear. No oropharyngeal exudate or posterior oropharyngeal erythema.   Eyes:      General: No scleral icterus.     Extraocular Movements: Extraocular movements intact.      Conjunctiva/sclera: Conjunctivae normal.      Pupils: Pupils are equal, round, and reactive to light.   Cardiovascular:      Rate and  Rhythm: Normal rate and regular rhythm.      Pulses: Normal pulses.      Heart sounds: Normal heart sounds. No murmur heard.     No gallop.   Pulmonary:      Effort: Pulmonary effort is normal. No respiratory distress.   Abdominal:      General: Bowel sounds are normal. There is no distension.      Palpations: Abdomen is soft. There is no mass.      Tenderness: There is no abdominal tenderness. There is no guarding or rebound.      Hernia: No hernia is present.   Musculoskeletal:         General: No swelling, deformity or signs of injury. Normal range of motion.      Cervical back: Normal range of motion and neck supple. No tenderness.      Right lower leg: No edema.      Left lower leg: No edema.   Skin:     General: Skin is warm.      Capillary Refill: Capillary refill takes less than 2 seconds.      Findings: No erythema, lesion or rash.   Neurological:      General: No focal deficit present.      Mental Status: She is alert. Mental status is at baseline.      Comments: GCS 14, a+ox2 sleepy and slow to respond   Psychiatric:         Mood and Affect: Mood normal.         Behavior: Behavior normal.         IMAGING SUMMARY:  (summary of new imaging findings, not a copy of dictation)    LABS:  Results from last 7 days   Lab Units 10/30/24  0115 10/29/24  0222 10/28/24  2348 10/28/24  2234 10/28/24  2109   WBC AUTO x10*3/uL 9.0 16.1* 16.1* 13.1* 9.6   HEMOGLOBIN g/dL 10.5* 12.9 12.4 11.9* 12.6   HEMATOCRIT % 32.4* 39.9 37.2 35.3* 39.1   PLATELETS AUTO x10*3/uL 164 229 189 195 202   NEUTROS PCT AUTO %  --   --   --  76.5 47.4   LYMPHS PCT AUTO %  --   --   --  18.5 45.0   MONOS PCT AUTO %  --   --   --  2.8 4.8   EOS PCT AUTO %  --   --   --  0.7 1.3     Results from last 7 days   Lab Units 10/28/24  2348 10/28/24  2234 10/28/24  2109   APTT seconds 27  --   --    INR   --  0.9 1.0     Results from last 7 days   Lab Units 10/30/24  0115 10/29/24  1211 10/29/24  0222 10/28/24  2348 10/28/24  6943 10/28/24  2105   SODIUM  mmol/L 132* 134* 134*   < > 132* 133*   POTASSIUM mmol/L 3.8 4.7 3.4*   < > 3.4* 3.3*   CHLORIDE mmol/L 100 99 97*   < > 99 102   CO2 mmol/L 25 22 21   < > 20* 20*   BUN mg/dL 9 8 11   < > 13 14   CREATININE mg/dL 0.49* 0.52 0.54   < > 0.65 0.79   CALCIUM mg/dL 8.0* 8.7 8.4*   < > 8.2* 8.8   PROTEIN TOTAL g/dL  --   --   --   --  7.1 7.1   BILIRUBIN TOTAL mg/dL  --   --   --   --  0.4 0.4   ALK PHOS U/L  --   --   --   --  56 60   ALT U/L  --   --   --   --  24 26   AST U/L  --   --   --   --  36 36   GLUCOSE mg/dL 135* 132* 136*   < > 163* 160*    < > = values in this interval not displayed.     Results from last 7 days   Lab Units 10/28/24  2237 10/28/24  2109   BILIRUBIN TOTAL mg/dL 0.4 0.4         I have reviewed all medications, laboratory results, and imaging pertinent for today's encounter.

## 2024-10-30 NOTE — PROGRESS NOTES
[facility]  TRAUMA SERVICE - PROGRESS NOTE    Patient Name: Pia Nguyen  MRN: [id number]  Admit Date: 1028  : 1955  AGE: 69 y.o.   GENDER: female  ==============================================================================  MECHANISM OF INJURY:   Fall down 5 stairs  LOC (yes/no?): yes, (+) ETOH  Anticoagulant / Anti-platelet Rx? (for what dx?): DAPT for PVD  Referring Facility Name (N/A for scene EMR run): [facility]     INJURIES:   L frontal SAH  Thin L SDH  IPH near L silvian fissure  pneumocephalus     OTHER MEDICAL PROBLEMS:  PVD  HTN  HLD  GERD  COPD/asthma  hypothyroidism     INCIDENTAL FINDINGS:  Bilateral carotid stenosis     PROCEDURES:  none    ==============================================================================  TODAY'S ASSESSMENT AND PLAN OF CARE:  Pia Nguyen is a 69 y.o. female admitted to the ICU s/p fall with SAH, SDH, and IPH. Repeat CT head stable from prior.    - ok for q4hr nuer  - continue keppra  - unasyn x5 days  - ok for diet  - geriatrics consult  - PMR consult  - ok for floor    Smooth Billingsley MD  PGY4 General Surgery  Trauma Surgery [id number]    ==============================================================================  CHIEF COMPLAINT / OVERNIGHT EVENTS:   NAEO.    MEDICAL HISTORY / ROS:  Admission history and ROS reviewed. Pertinent changes as follows:  N/A    PHYSICAL EXAM:  Heart Rate:  []   Temp:  [35.8 °C (96.4 °F)-36.5 °C (97.7 °F)]   Resp:  [13-31]   BP: ()/()   SpO2:  [87 %-100 %]   Constitutional:       General: She is not in acute distress.     Appearance: Normal appearance. She is normal weight. She is not ill-appearing or diaphoretic.   HENT:      Head: Normocephalic and atraumatic.      Mouth/Throat:      Mouth: Mucous membranes are moist.      Pharynx: Oropharynx is clear. No oropharyngeal exudate or posterior oropharyngeal erythema.   Eyes:      General: No scleral icterus.     Extraocular  Movements: Extraocular movements intact.      Conjunctiva/sclera: Conjunctivae normal.      Pupils: Pupils are equal, round, and reactive to light.   Cardiovascular:      Rate and Rhythm: Normal rate and regular rhythm.      Pulses: Normal pulses.      Heart sounds: Normal heart sounds. No murmur heard.     No gallop.   Pulmonary:      Effort: Pulmonary effort is normal. No respiratory distress.   Abdominal:      General: Bowel sounds are normal. There is no distension.      Palpations: Abdomen is soft. There is no mass.      Tenderness: There is no abdominal tenderness. There is no guarding or rebound.      Hernia: No hernia is present.   Musculoskeletal:         General: No swelling, deformity or signs of injury. Normal range of motion.      Cervical back: Normal range of motion and neck supple. No tenderness.      Right lower leg: No edema.      Left lower leg: No edema.   Skin:     General: Skin is warm.     Findings: No erythema, lesion or rash.   Neurological:      General: No focal deficit present.      Mental Status: She is alert. Mental status is at baseline.      Comments: GCS 14, A&Ox2  Psychiatric:         Mood and Affect: Mood normal.         Behavior: Behavior normal.    IMAGING SUMMARY:  (summary of new imaging findings, not a copy of dictation)  CT head: stable SAH, SDH, IPH  CT maxillofacial: ethmoid sinus fracture    LABS:  Results from last 7 days   Lab Units 10/30/24  0115 10/29/24  0222 10/28/24  2348 10/28/24  2234 10/28/24  2109   WBC AUTO x10*3/uL 9.0 16.1* 16.1* 13.1* 9.6   HEMOGLOBIN g/dL 10.5* 12.9 12.4 11.9* 12.6   HEMATOCRIT % 32.4* 39.9 37.2 35.3* 39.1   PLATELETS AUTO x10*3/uL 164 229 189 195 202   NEUTROS PCT AUTO %  --   --   --  76.5 47.4   LYMPHS PCT AUTO %  --   --   --  18.5 45.0   MONOS PCT AUTO %  --   --   --  2.8 4.8   EOS PCT AUTO %  --   --   --  0.7 1.3     Results from last 7 days   Lab Units 10/28/24  2348 10/28/24  2234 10/28/24  2109   APTT seconds 27  --   --    INR    --  0.9 1.0     Results from last 7 days   Lab Units 10/30/24  0115 10/29/24  1211 10/29/24  0222 10/28/24  2348 10/28/24  2234 10/28/24  2109   SODIUM mmol/L 132* 134* 134*   < > 132* 133*   POTASSIUM mmol/L 3.8 4.7 3.4*   < > 3.4* 3.3*   CHLORIDE mmol/L 100 99 97*   < > 99 102   CO2 mmol/L 25 22 21   < > 20* 20*   BUN mg/dL 9 8 11   < > 13 14   CREATININE mg/dL 0.49* 0.52 0.54   < > 0.65 0.79   CALCIUM mg/dL 8.0* 8.7 8.4*   < > 8.2* 8.8   PROTEIN TOTAL g/dL  --   --   --   --  7.1 7.1   BILIRUBIN TOTAL mg/dL  --   --   --   --  0.4 0.4   ALK PHOS U/L  --   --   --   --  56 60   ALT U/L  --   --   --   --  24 26   AST U/L  --   --   --   --  36 36   GLUCOSE mg/dL 135* 132* 136*   < > 163* 160*    < > = values in this interval not displayed.     Results from last 7 days   Lab Units 10/28/24  2234 10/28/24  2109   BILIRUBIN TOTAL mg/dL 0.4 0.4           I have reviewed all medications, laboratory results, and imaging pertinent for today's encounter.

## 2024-10-30 NOTE — PROGRESS NOTES
Occupational Therapy    OT Treatment    Patient Name: Pia Nguyen  MRN: [id number]  Department: [facility] TSU  Room: 03/03-A  Today's Date: 10/30/2024  Time Calculation  Start Time: 0944  Stop Time: 1028  Time Calculation (min): 44 min        Assessment:  OT Assessment: Pia continues to progress toward functional goals. Pt requires cues for sequencing but demos ability to complete routine tasks with s/u. Pt would continue to benefit from skilled OT intervention to return to PLOF  Evaluation/Treatment Tolerance: Patient tolerated treatment well  End of Session Communication: Bedside nurse  End of Session Patient Position: Up in chair, Alarm on (RN aware, family present, sitter in room)  Evaluation/Treatment Tolerance: Patient tolerated treatment well  Plan:  Treatment Interventions: ADL retraining, Visual perceptual retraining, Functional transfer training, Endurance training, Cognitive reorientation, Equipment evaluation/education, Neuromuscular reeducation, Fine motor coordination activities, Compensatory technique education  OT Frequency: 4 times per week  OT Discharge Recommendations: High intensity level of continued care (TBI rehab)  OT Recommended Transfer Status: Assist of 2  OT - OK to Discharge: Yes  Treatment Interventions: ADL retraining, Visual perceptual retraining, Functional transfer training, Endurance training, Cognitive reorientation, Equipment evaluation/education, Neuromuscular reeducation, Fine motor coordination activities, Compensatory technique education    Subjective   Previous Visit Info:  OT Last Visit  OT Received On: 10/30/24  General:  General  Family/Caregiver Present: Yes  Caregiver Feedback: daughter and  present, supportive  Prior to Session Communication: Bedside nurse  Patient Position Received: Bed, 3 rail up, Alarm on  Preferred Learning Style: auditory, verbal, visual  General Comment: Pt supine in bed upon entry to room. Pt alert and much more participative this date. Pt  with labile mood, agitated and pleasant. Willing to participate in therapy and get up to chair, immediately wanting to return, easily redirected.  Precautions:  Medical Precautions: Fall precautions, Spinal precautions (sinus precautions)  Precautions Comment: SBP <160 HOB >30    Vital Signs (Past 2hrs)        Date/Time Vitals Session Patient Position Pulse Resp SpO2 BP MAP (mmHg)    10/30/24 0944 Pre OT  --  77  17  100 %  143/76  83                         Pain:  Pain Assessment  Pain Assessment: 0-10  0-10 (Numeric) Pain Score:  (unrated complains of headache at EOB, RN notified)    Objective    Cognition:  Cognition  Overall Cognitive Status: Impaired  Orientation Level: Disoriented to place, Disoriented to time, Disoriented to situation  Following Commands:  (follows between 50 and 75% single step commands, self directed, easily redirected majority of time)  Cognition Comments: RLA IV emerging V. Demos performance of automatic ADLs, grooming, feeding tasks  Insight: Moderate  Impulsive: Moderately  Processing Speed: Delayed  Coordination:     Activities of Daily Living: Feeding  Feeding Level of Assistance: Close supervision  Feeding Where Assessed: Edge of bed  Feeding Comments: demos finger feeding fruit and pastry sitting EOB    Grooming  Grooming Level of Assistance: Close supervision  Grooming Where Assessed: Edge of bed, Chair  Grooming Comments: completes tooth brushing and hair combing  Functional Standing Tolerance:     Bed Mobility/Transfers: Bed Mobility  Bed Mobility: Yes  Bed Mobility 1  Bed Mobility 1: Supine to sitting  Level of Assistance 1: Minimum assistance (x2)  Bed Mobility Comments 1: requires tactile cues for sequencing  Bed Mobility 2  Bed Mobility  2: Rolling right  Level of Assistance 2: Moderate assistance  Bed Mobility Comments 2: rolling from side lying to supine    Transfers  Transfer: Yes  Transfer 1  Transfer From 1: Bed to  Transfer to 1: Stand  Technique 1: Sit to  stand  Transfer Level of Assistance 1: Minimum assistance (x2)  Transfers 2  Transfer From 2: Stand to  Transfer to 2: Chair with arms  Technique 2: Stand to sit  Transfer Level of Assistance 2: Minimum assistance (x2)  Trials/Comments 2: requires tactile cues for direction and sequencing       Therapy/Activity: Therapeutic Activity  Therapeutic Activity Performed: Yes  Therapeutic Activity 1: Pt tolerates sitting EOB x10 minutes with CGA completing ADLs prior to transfer to chair  Therapeutic Activity 2: Reorientation to situation, place and POC.      Outcome Measures:[facility] Daily Activity  Putting on and taking off regular lower body clothing: A lot  Bathing (including washing, rinsing, drying): A lot  Putting on and taking off regular upper body clothing: A little  Toileting, which includes using toilet, bedpan or urinal: A lot  Taking care of personal grooming such as brushing teeth: A little  Eating Meals: A little  Daily Activity - Total Score: 15        , Confusion Assessment Method-ICU (CAM-ICU)  Feature 1: Acute Onset or Fluctuating Course: Positive  Feature 2: Inattention: Positive  Feature 4: Disorganized Thinking: Positive  Overall CAM-ICU: Positive  , and Early Mobility/Exercise Safety Screen: Proceed with mobilization - No exclusion criteria met  ICU Mobility Scale: Transferring bed to chair [5]    Education Documentation  Body Mechanics, taught by Michaela Hardy OT at 10/30/2024 11:15 AM.  Learner: Patient  Readiness: Acceptance  Method: Explanation  Response: Needs Reinforcement    Precautions, taught by Michaela Hardy OT at 10/30/2024 11:15 AM.  Learner: Patient  Readiness: Acceptance  Method: Explanation  Response: Needs Reinforcement    ADL Training, taught by Michaela Hardy OT at 10/30/2024 11:15 AM.  Learner: Patient  Readiness: Acceptance  Method: Explanation  Response: Needs Reinforcement    Body Mechanics, taught by Michaela Hardy OT at 10/30/2024 11:15 AM.  Learner: Family  Readiness:  Acceptance  Method: Explanation  Response: Verbalizes Understanding    Precautions, taught by Michaela Hardy OT at 10/30/2024 11:15 AM.  Learner: Family  Readiness: Acceptance  Method: Explanation  Response: Verbalizes Understanding    ADL Training, taught by Michaela Hardy OT at 10/30/2024 11:15 AM.  Learner: Family  Readiness: Acceptance  Method: Explanation  Response: Verbalizes Understanding    Education Comments  No comments found.        OP EDUCATION:       Goals:  Encounter Problems       Encounter Problems (Active)       ADLs       Patient will perform UB and LB bathing  with supervision level of assistance. (Progressing)       Start:  10/29/24    Expected End:  11/19/24            Patient with complete upper body dressing with independent level of assistance donning and doffing all UE clothes with no adaptive equipment while edge of bed  (Progressing)       Start:  10/29/24    Expected End:  11/19/24            Patient with complete lower body dressing with supervision level of assistance donning and doffing all LE clothes  with no adaptive equipment while edge of bed  (Progressing)       Start:  10/29/24    Expected End:  11/19/24               BALANCE       Patientt will maintain static sitting balance during ADL task with supervision level of assistance drop down in order to demonstrate decreased risk of falling and improved postural control. (Progressing)       Start:  10/29/24    Expected End:  11/19/24               BALANCE       Pt will improve BUE coordination by completing meaningful occupations during treatment sessions.  (Progressing)       Start:  10/29/24    Expected End:  11/19/24               COGNITION/SAFETY       Patient will recall and adhere to spinal precautions during all functional mobility/ADL tasks in order to demonstrate improved understanding and promote healing post op (Progressing)       Start:  10/29/24    Expected End:  11/19/24            Patient will follow 100% Simple  commands to allow improved ADL performance. (Progressing)       Start:  10/29/24    Expected End:  11/19/24               TRANSFERS       Patient will perform bed mobility stand by assist level of assistance in order to improve safety and independence with mobility (Progressing)       Start:  10/29/24    Expected End:  11/19/24                     10/30/24 at 11:16 AM - Michaela Hardy OT

## 2024-10-30 NOTE — CARE PLAN
The patient's goals for the shift include      The clinical goals for the shift include        Problem: Skin  Goal: Decreased wound size/increased tissue granulation at next dressing change  Outcome: Progressing  Goal: Participates in plan/prevention/treatment measures  Outcome: Progressing  Goal: Prevent/manage excess moisture  Outcome: Progressing  Goal: Prevent/minimize sheer/friction injuries  Outcome: Progressing  Goal: Promote/optimize nutrition  Outcome: Progressing  Goal: Promote skin healing  Outcome: Progressing     Problem: Pain - Adult  Goal: Verbalizes/displays adequate comfort level or baseline comfort level  Outcome: Progressing     Problem: Safety - Adult  Goal: Free from fall injury  Outcome: Progressing     Problem: Discharge Planning  Goal: Discharge to home or other facility with appropriate resources  Outcome: Progressing     Problem: Chronic Conditions and Co-morbidities  Goal: Patient's chronic conditions and co-morbidity symptoms are monitored and maintained or improved  Outcome: Progressing     Problem: Fall/Injury  Goal: Not fall by end of shift  Outcome: Progressing  Goal: Be free from injury by end of the shift  Outcome: Progressing  Goal: Verbalize understanding of personal risk factors for fall in the hospital  Outcome: Progressing  Goal: Verbalize understanding of risk factor reduction measures to prevent injury from fall in the home  Outcome: Progressing  Goal: Use assistive devices by end of the shift  Outcome: Progressing  Goal: Pace activities to prevent fatigue by end of the shift  Outcome: Progressing     Problem: Pain  Goal: Takes deep breaths with improved pain control throughout the shift  Outcome: Progressing  Goal: Turns in bed with improved pain control throughout the shift  Outcome: Progressing  Goal: Walks with improved pain control throughout the shift  Outcome: Progressing  Goal: Performs ADL's with improved pain control throughout shift  Outcome: Progressing  Goal:  Participates in PT with improved pain control throughout the shift  Outcome: Progressing  Goal: Free from opioid side effects throughout the shift  Outcome: Progressing  Goal: Free from acute confusion related to pain meds throughout the shift  Outcome: Progressing

## 2024-10-30 NOTE — CONSULTS
PM&R Consult Note - Traumatic Brain Injury  Patient: Pia Nguyen   Age/sex: 69 y.o.   Medical Record #: [id number]     Consulting physician: Dr. Pitts    Procedures performed on/related to this admission:  none    Chief complaint:   Impairments and acitivities limitations in ADLs, mobility, functional communication, and cognition secondary to TBI  Mechanism of Injury: fall down ~6 steps    HPI:   Pia Nguyen is a 69 y.o. year old female patient who presented to ED after fall down 6 steps with loss of consciousness and + head strike. No surgical intervention per neurosurgery. Cleared C-collar. Holding DAPT. Ethmoid sinus fracture. Consulted plastics who recc'd no intervention.      INJURIES:   L frontal SAH  Thin L SDH  IPH near L silvian fissure  pneumocephalus     OTHER MEDICAL PROBLEMS:  PVD  HTN  HLD  GERD  COPD/asthma  hypothyroidism    Hospital course was complicated by agitation.     Present status: stable  PO: adult regular  Pain: none  Bowel: volitional  Bladder: volitional   DVT prophylaxis with lovenox.   Weight bearing status: WBAT    Precautions: TBI    No past medical history on file.  reviewed    No past surgical history on file.     No family history on file.      No Known Allergies     acetaminophen, 650 mg, oral, q6h  carvedilol, 12.5 mg, oral, BID  [START ON 10/31/2024] enoxaparin, 30 mg, subcutaneous, BID  folic acid, 0.4 mg, oral, Daily  levETIRAcetam, 500 mg, intravenous, q12h  levothyroxine, 100 mcg, oral, Daily  pantoprazole, 40 mg, oral, Daily before breakfast  rosuvastatin, 40 mg, oral, Nightly  sertraline, 75 mg, oral, Daily  thiamine, 100 mg, oral, Daily         PRN medications: albuterol, HYDROmorphone, ondansetron, oxyCODONE     Social History:  Tobacco/EtOh/Illicits:EtOH  Working History:retired  Social supports: , 24 hour assistance may be available  Home situation: Lives in 1 level home with 3 steps to enter    Functional History:  Home DME: none   Premorbid ADL's:  independent   Premorbid Mobility: independent   Present: significant cognitive deficits    Physical Therapy: 10/30 bed mobility min A but complicated by patient compliance and inability to follow commands; able to sit at  EOB    Occupational Therapy: 10/30 supervision for eating and grooming    Speech Therapy: not evaluated    Review of Systems   Unable to adequately assess given speech difficulty      Physical Exam   GENERAL: sleeping but awakens easily  HEENT - abrasions to skull  CARDIOVASCULAR: extremities warm, well perfused, DP pulses 2+ bilaterally  RESPIRATORY: no conversational dyspnea, comfortable on room air, symmetrical chest rise  ABDOMEN: Soft, non-tender, normal bowel sounds, no distention  MSK: No visible deformities or local swelling  SKIN: Normal color, warm, dry, no rashes   Psych: trying to sleep throughout exam; agitated intermittently    NEURO: A&O x 1; complicated by significant aphasia  - Word salad; unable to repeat; can mimic movements but difficult following verbal commands   5/5 strength in bilateral upper and lower extremities also limited by command-following  Sensation - unable to assess      Lab Results   Component Value Date    WBC 9.0 10/30/2024    HGB 10.5 (L) 10/30/2024    HCT 32.4 (L) 10/30/2024    MCV 84 10/30/2024     10/30/2024        Lab Results   Component Value Date    CREATININE 0.44 (L) 10/30/2024    BUN 8 10/30/2024     (L) 10/30/2024    K 3.7 10/30/2024    CL 98 10/30/2024    CO2 24 10/30/2024        IMPRESSION:  Pia Nguyen is a 69 y.o. year old female patient presented to ED after fall down 6 steps with loss of consciousness and + head strike. No surgical intervention per neurosurgery. Cleared C-collar. Holding DAPT. Ethmoid sinus fracture. Consulted plastics who recc'd no intervention.     Recommendations:  # TBI in setting of falling down steps  # SAH  # SDH  - [place] Level 4  - Neurostimulation: None needed at this time   - Agitation:  Seroquel 25mg q6h first line. Please avoid Haldol unless patient a risk to self and/or staff. If needed, Haldol 5mg q6hprn, soft wrist restraints.  - Dysautonomia: No evidence of paroxysmal sympathetic hyperactivity over past 24 hours. Continue to monitor for episodes of tachycardia, hypertension and tachypnea.    - Spasticity: None appreciated currently. Benzos should not be used long term in TBI.  - Post traumatic seizure: None at this time.   - Elevated HOB > 45 degrees    # Immobility   - Prevent secondary complications   - Skin protection: low air loss mattress, turn q 2 hours in bed, maintain bowel and bladder continence/containment  - Prevention of pulmonary complications: incentive spirometry and pulmonary toileting  - Maintain adequate nutrition and hydration  - Q shift PROM of upper and lower extremities to prevent contracture    # Impaired mobility and Impaired independence with ADLs and I/ADLs  - Continue PT, working on bed mobility, transfers, balance, endurance, strength, gait, eval for appropriate assistive device  - Continue OT, working on functional cognition, functional mobility, upper limb strength/coordination, balance, endurance, eval for appropriate adaptive equipment, ADLs, and I/ADLs.    # Dispo  - At this time patient is following commands and demonstrating cognitive recovery and will likely be a good candidate for inpatient rehabilitation. Patient would benefit from an acute inpatient rehab stay to improve functional outcome of impaired mobility, ADL's, transfers, and self-care. Treatment plan will include a comprehensive rehabilitation program with intense physical therapy, occupational therapy, and speech language pathology for 3 hours/day, 5 days/week.  Patient also requires physician supervision for monitoring,  TBI management adjusting medications for alertness if needed, and neurogenic bowel and bladder.   - Patient is a candidate for [facility] TBI rehab when medically and  orthopedically stable.  - Post rehab destination: anticipated home  - Must be off IV pain meds prior to transfer  - Must be off restraints and sitter for 24 hrs prior to transfer.   - For questions, please contact via [employer]     We will follow along with you.  Thank you for the consult.    Nitin Briceño, DO  Physical Medicine and Rehabilitation PGY-4  Available via [facility]     Patient seen and examined with attending Dr. Pitts, who agrees with assessment and plan.     Supervisory note:  Seen with Dr MAGGIE Briceño, resident.  I saw and evaluated the patient. I personally obtained the key and critical portions of the history and physical exam. I reviewed the resident's documentation and discussed the patient with the resident. I agree with the resident's medical decision making as documented in the resident's note.     Moderate mixed aphasia but improving by history.   seems very supportive and capable.  Needs Speech therapy in rehab that usually not available at SNF setting, also may need medication for altertness pending her progress now that out of ICU.  Will need final approval from rehab medical director but I would anticipate acute rehab at discharge.  Needs to be able to be without sitter for 24 hours also.     Jonathan Pitts M.D, FAAPMR, R-MSK  Chief, Division of PM&R  Board Certified in PM&R, Sports Medicine and Musculoskeletal Ultrasound

## 2024-10-30 NOTE — NURSING NOTE
Pt arrived to LT8 and four point eye check was completed upon transfer pt. Has no skin issues only minor bruising and a scalp hematoma to the right posterior head.

## 2024-10-30 NOTE — CONSULTS
"Consults    Consult requested by: CANDIDA Kessler  Consult performed by: CANDIDA Barahona     Primary Team: Trauma     Admit Date: 10/28/2024    Emergency Contact:   Extended Emergency Contact Information  Primary Emergency Contact: Hamilton Nguyen  Address: [address] [place] [id number] [place]  Home Phone: [phone]  Work Phone: [phone]  Relation: Spouse   needed? No  Secondary Emergency Contact: Lee Ann Brown  Mobile Phone: [phone]  Relation: Daughter   needed? No     Reason For Consult: multiple falls     History Of Present Illness:  Pia Nguyen is a 69 y.o. female with HTN, HLD, PAD s/p left fem and popliteal artery angioplasty 7/2023 on ASA and Plavix, asthma, COPD, hypothyroid, depression/anxiety who presented to [facility] after a fall while intoxicated down 6 steps. Patient found to have SAH, IPH near left silvian fissure, pneumocephalus, left subdural hematoma. CT face with concern for ethmoid sinus fracture.       History per patient:  History limited due to acute delirium. Patient reports a headache and dizziness, but cannot answer further questions.     History per family/caregiver: (obtained in addition due to patient’s delirium )  Patient's , Hamilton, at bedside and provides history. Hamilton reports patient has had 2 other falls, but no injury, one time she tripped on the edge of a rug, he cannot recall circumstance of the other fall. He reports her balance is unsteady at times. At baseline, she is \"extremely independent.\" She is independent with ADLs and IADLs. Patient is working at a local school, part time as . She drives. He has no concerns with her memory. Patient states she drinks beer, usually 1-2 beers during the week. He states there are some days she doesn't drink. On the weekend she drinks 3-4 beers daily. Hamilton has no concerns regarding her ETOH intake.     What matters most to the " patient:  Caring for others     Prior to Admission Meds  Prior to Admission Medications   Prescriptions Last Dose Informant Patient Reported? Taking?   albuterol 90 mcg/actuation inhaler Unknown Family Member Yes Yes   Sig: Inhale 2 puffs every 4 hours if needed.   aspirin 81 mg EC tablet 10/28/2024 Morning Family Member Yes Yes   Sig: Take 1 tablet (81 mg) by mouth once daily.   azelastine (Optivar) 0.05 % ophthalmic solution 10/28/2024 Family Member Yes Yes   Sig: Administer 1 drop into both eyes once daily.   benzonatate (Tessalon) 100 mg capsule Unknown Family Member Yes Yes   Sig: Take 1 capsule (100 mg) by mouth 3 times a day as needed for cough.   calcium carbonate (Tums) 200 mg calcium chewable tablet Unknown Family Member Yes No   Sig: Chew 1 tablet (500 mg) once daily as needed for indigestion or heartburn.   carvedilol (Coreg) 6.25 mg tablet 10/28/2024 Family Member Yes Yes   Sig: Take 1 tablet (6.25 mg) by mouth 2 times daily (morning and late afternoon).   clopidogrel (Plavix) 75 mg tablet Unknown Other Yes Yes   Sig: Take 1 tablet (75 mg) by mouth once daily.   ibandronate (Boniva) 150 mg tablet 10/28/2024 Family Member Yes Yes   Sig: Take 1 tablet (150 mg) by mouth every 30 (thirty) days.   levothyroxine (Synthroid, Levoxyl) 100 mcg tablet Unknown Other Yes Yes   Sig: Take 1 tablet (100 mcg) by mouth early in the morning..   multivitamin tablet 10/28/2024 Family Member Yes Yes   Sig: Take 1 tablet by mouth once daily.   omega 3-dha-epa-fish oil (Fish OiL) 1,000 (120-180) mg capsule 10/28/2024 Family Member Yes Yes   Sig: Take 1 capsule (1,000 mg) by mouth once daily.   omeprazole (PriLOSEC) 20 mg DR capsule 10/28/2024 Family Member Yes Yes   Sig: Take 2 capsules (40 mg) by mouth once daily in the morning.   rosuvastatin (Crestor) 40 mg tablet 10/28/2024 Family Member Yes Yes   Sig: Take 1 tablet (40 mg) by mouth once daily in the morning. Take before meals.   sertraline (Zoloft) 50 mg tablet Unknown  Other Yes Yes   Sig: Take 1.5 tablets (75 mg) by mouth once daily.      Facility-Administered Medications: None        Current Meds in Hospital  Current Facility-Administered Medications   Medication Dose Route Frequency Provider Last Rate Last Admin    acetaminophen (Tylenol) tablet 650 mg  650 mg oral q6h Michelle Ozuna MD   650 mg at 10/30/24 1047    albuterol 90 mcg/actuation inhaler 2 puff  2 puff inhalation q4h PRN CANDIDA Alvarez        carvedilol (Coreg) tablet 12.5 mg  12.5 mg oral BID Benji Saavedra MD   12.5 mg at 10/30/24 0944    [START ON 10/31/2024] enoxaparin (Lovenox) syringe 30 mg  30 mg subcutaneous BID CANDIDA Alvarez        folic acid (Folvite) tablet 0.4 mg  0.4 mg oral Daily Michelle Ozuna MD   0.4 mg at 10/30/24 0944    HYDROmorphone PF (Dilaudid) injection 0.2 mg  0.2 mg intravenous q3h PRN Michelle Ozuna MD        levETIRAcetam (Keppra) 500 mg in sodium chloride (iso)  mL  500 mg intravenous q12h Benji Saavedra MD   Stopped at 10/30/24 1003    levothyroxine (Synthroid, Levoxyl) tablet 100 mcg  100 mcg oral Daily CANDIDA Alvarez   100 mcg at 10/30/24 0610    ondansetron (Zofran) injection 4 mg  4 mg intravenous q6h PRN Michelle Ozuna MD   4 mg at 10/30/24 1055    oxyCODONE (Roxicodone) immediate release tablet 5 mg  5 mg oral q6h PRN Michelle Ozuna MD        oxygen (O2) therapy   inhalation Continuous PRN - O2/gases Benji Saavedra MD        pantoprazole (ProtoNix) EC tablet 40 mg  40 mg oral Daily before breakfast CANDIDA Alvarez   40 mg at 10/30/24 0610    rosuvastatin (Crestor) tablet 40 mg  40 mg oral Nightly Benji Saavedra MD   40 mg at 10/29/24 2009    sertraline (Zoloft) tablet 75 mg  75 mg oral Daily Clara Dangelo, APRN-CNP   75 mg at 10/30/24 0944    thiamine (Vitamin B-1) tablet 100 mg  100 mg oral Daily Michelle Ozuna MD   100 mg at 10/30/24 0944        The patient's outpatient electronic medical record (EMR) is  reviewed, notable information includes:     PCP visit on 9/20/24 for follow up, no change in medications. Labs Vitamin D: 38, Folate 13.4, Vitamin B12: 330, T4: 1.3, TSH: 1.08      Vascular surgery follow up s/p atherectomy of left superficial femoral artery and angioplasty of left superficial femoral artery in 2023. Had follow up US No claudication with walking. No med changes    No hospitalizations, no ED visits     Past Medical History  No past medical history on file.     Surgical History  No past surgical history on file.     Family History  Her family history is not on file.     Social History  She has no history on file for tobacco use, alcohol use, and drug use.  -Alcohol use: Yes - 1-2 beers daily during the week, 3-4 daily on the weekend   -Tobacco use: No  -Illicit drug use: No  -Exercise: none formal but stays active with work  -Spiritual needs: Quaker  -Marital Status:    Occupation:    Highest Level of Education: High School  Community Resources: none  [place]: No  Current living environment: lives in single family home with  and son     Activities of Daily Living:  Basic ADLs: (I= independent, A= assistance, D= dependent)  Bathing: I, Dressing: I, Toileting: I, Transferring: I, Continence: I, Feeding: I    Harp Index:  6  Instrumental ADLs: (I= independent, A= assistance, D= dependent)  Ability to use phone: I, Shopping: I, Cooking: I, Housekeeping: I, Laundry: I, Transportation: I, Medications: I, Handle Finances: I   Loving Scale:  8    Allergies  Patient has no known allergies.    Review of Systems  Review of System: Obtained from patient and  due to patient's delirium   Constitutional:   -Night sweats/fever: none      -Weight change: none     Integumentary: no concerns     Ears, Nose, Throat:  -Hearing loss: none         Eyes:  -Vision loss: none     Cardiovascular:  -Chest Pain: none  -Orthopnea: none      -Paroxysmal nocturnal dyspnea: none  -Edema:  none    Respiratory:   -Dyspnea: none  -Wheezing: occasional     Gastrointestinal:           -Appetite: none   -Difficulty chewing/ swallowing: none  -Heartburn: no concerns  -Bowels: no concerns     Genitourinary:   -Incontinence: none    Musculoskeletal:  -Mobility: no assistive devices   -Pain: none    Neurological:  -Confusion: +   -Falls: +   -Gait: unsteady at times   -Memory: no concerns   -Tremor: none     Psychiatric:  -Mood: no concerns   -Sleep: sleeps well     Endocrine: hypothyroid, osteoporosis     Hematologic/ Lymphatic: no concerns     Allergic/ Immunologic: no concerns      Documents on file and valid:  Advance Directive/Living Will: No   Health Care Power of : No  Other:   Code Status: Full Code      Confusion Assessment Method (CAM)  Not on file.    Brandon Cognitive Assessment (MoCA)  Not on file.    Geriatric Depression Scale (GDS)  Not on file.    Mini-Cog (Early Dementia Detection)  Not on file.    Folstein Mini-Mental State Exam (MMSE)  Not on file.    Patient Health Questionnaire (PHQ-9)  Not on file.     Physical Exam  Constitutional:       General: She is sleeping.   HENT:      Ears:      Comments: Hearing intact   Eyes:      Comments: Would not open eyes for exam    Cardiovascular:      Rate and Rhythm: Normal rate and regular rhythm.   Pulmonary:      Effort: Pulmonary effort is normal.      Breath sounds: Normal breath sounds.   Abdominal:      General: Bowel sounds are normal. There is no distension.      Palpations: Abdomen is soft.   Neurological:      Mental Status: She is easily aroused. She is lethargic.   Psychiatric:         Speech: Speech is delayed and tangential.         Behavior: Behavior is uncooperative.         Last Recorded Vitals      10/30/2024     5:00 AM 10/30/2024     6:00 AM 10/30/2024     7:00 AM 10/30/2024     7:46 AM 10/30/2024     8:00 AM 10/30/2024     9:00 AM 10/30/2024     9:44 AM   Vitals   Systolic 146 143 126  131 143 143   Diastolic 69 62 77  66  62 76   Heart Rate 79 74 72  72 74 77   Temp    36.1 °C (97 °F)      Resp 23 22 17  21 19 17      There were no vitals filed for this visit.     Confusion Assessment Method(CAM) for diagnosis of delirium:    1.  Acute onset or fluctuating course: present  2.  Inattention: present  3.  Disorganized thinking: present  4.  Altered level of consciousness: present  CAM: positive    AT Score For Assessment of Delirium and Cognitive Impairment:    Alertness: 4  Normal(fully alert,but not agitated, throughout assessment)=0  Mild sleepiness for <10 seconds after walking, then normal=0  Clearly abnormal=4  2.  AMT4: 2  No mistakes=0  One mistake=1  Two or more mistakes/untestable=2  3.  Attention: 2  Achieves seven months or more correctly=0  Starts but scores <7 months/ refuses to start=1  Untestable(cannot start because unwell, drowsy, inattentive)=2  4.  Acute: 4  No=0  Yes=4    Total Score: 12   4 or above: Possible delirium +/- cognitive impairment  1-3: Possible cognitive impairment  0: Delirium or severe cognitive impairment unlikely(but delirium still possible if (4) information incomplete)     Relevant Results  Lab Results   Component Value Date    TSH 0.12 (L) 06/10/2020     Results from last 7 days   Lab Units 10/30/24  0115 10/29/24  1211 10/29/24  0222 10/28/24  2348 10/28/24  2234 10/28/24  2109   WBC AUTO x10*3/uL 9.0  --  16.1* 16.1* 13.1* 9.6   HEMOGLOBIN g/dL 10.5*  --  12.9 12.4 11.9* 12.6   HEMATOCRIT % 32.4*  --  39.9 37.2 35.3* 39.1   ALT U/L  --   --   --   --  24 26   AST U/L  --   --   --   --  36 36   SODIUM mmol/L 132* 134* 134* 131* 132* 133*   POTASSIUM mmol/L 3.8 4.7 3.4* 3.6 3.4* 3.3*   CHLORIDE mmol/L 100 99 97* 98 99 102   CREATININE mg/dL 0.49* 0.52 0.54 0.63 0.65 0.79   BUN mg/dL 9 8 11 12 13 14   CO2 mmol/L 25 22 21 19* 20* 20*   INR   --   --   --   --  0.9 1.0       Recent Imaging Results      CT head wo IV contrast  Narrative: Interpreted By:  Jonathan Rebolledo,   STUDY:  CT HEAD WO IV  CONTRAST;  10/30/2024 5:19 am      INDICATION:  Signs/Symptoms:reevaluation of SAH, SDH, IPH.      COMPARISON:  10/29/2024      ACCESSION NUMBER(S):  [id number]      ORDERING CLINICIAN:  ANGELY CARLOS      TECHNIQUE:  Axial CT images of the head were obtained without intravenous  contrast administration.      FINDINGS:  The CT of the head again demonstrates areas of hyperdense  intraparenchymal hemorrhage centered within left temporal lobe mildly  improved when compared with the prior study dated 10/29/2024. There  is persistent surrounding edema and mass effect contributing to  asymmetric effacement of surrounding sulci and partial effacement of  the adjacent left lateral ventricle.          There is again evidence of similar extra-axial hyperdense hemorrhage  most pronounced surrounding the left temporal lobe which may be  subarachnoid and/or subdural in location. There is again evidence of  mildly prominent hyperdensity along the tentorium left greater than  right suggestive of subdural hemorrhage.      Additional hyperdense subarachnoid hemorrhage is again identified  along sulci of the cerebral hemispheres bilaterally left greater than  right mildly improved when compared with the prior exam dated  10/29/2024.      There is again evidence of hyperdense intraventricular hemorrhage  layering within the lateral ventricles similar when compared with  10/29/2024.      There has been interval resolution of the previously noted small  amount of pneumocephalus.      The ventricular system remains nondilated without evidence of  hydrocephalus.      There is no significant midline shift.      Atherosclerotic calcifications are noted along the carotid siphons.      There is persistent opacification and partial opacification of  scattered ethmoid air cells. There is lobulated mucosal thickening  and/or retention cysts/polyps within the maxillary sinuses and  sphenoid sinus.      There is persistent but improved  extracranial soft tissue swelling  noted within the right scalp and face likely posttraumatic in  etiology.          Impression: The CT of the head again demonstrates areas of hyperdense  intraparenchymal hemorrhage centered within left temporal lobe mildly  improved when compared with the prior study dated 10/29/2024. There  is persistent surrounding edema and mass effect contributing to  asymmetric effacement of surrounding sulci and partial effacement of  the adjacent left lateral ventricle.      There is again evidence of similar extra-axial hyperdense hemorrhage  most pronounced surrounding the left temporal lobe which may be  subarachnoid and/or subdural in location. There is again evidence of  mildly prominent hyperdensity along the tentorium left greater than  right suggestive of subdural hemorrhage.      Additional hyperdense subarachnoid hemorrhage is again identified  along sulci of the cerebral hemispheres bilaterally left greater than  right mildly improved when compared with the prior exam dated  10/29/2024.      There is again evidence of hyperdense intraventricular hemorrhage  layering within the lateral ventricles similar when compared with  10/29/2024.      There has been interval resolution of the previously noted small  amount of pneumocephalus.      The ventricular system remains nondilated without evidence of  hydrocephalus.      MACRO:  None.      Signed by: Jonathan Rebolledo 10/30/2024 8:24 AM  Dictation workstation:   [id number]      Head/Brain Imaging  === Results for orders placed during the hospital encounter of 10/28/24 ===    CT head wo IV contrast [[id number]] 10/30/2024    Status: Normal  The CT of the head again demonstrates areas of hyperdense  intraparenchymal hemorrhage centered within left temporal lobe mildly  improved when compared with the prior study dated 10/29/2024. There  is persistent surrounding edema and mass effect contributing to  asymmetric effacement of surrounding sulci  and partial effacement of  the adjacent left lateral ventricle.    There is again evidence of similar extra-axial hyperdense hemorrhage  most pronounced surrounding the left temporal lobe which may be  subarachnoid and/or subdural in location. There is again evidence of  mildly prominent hyperdensity along the tentorium left greater than  right suggestive of subdural hemorrhage.    Additional hyperdense subarachnoid hemorrhage is again identified  along sulci of the cerebral hemispheres bilaterally left greater than  right mildly improved when compared with the prior exam dated  10/29/2024.    There is again evidence of hyperdense intraventricular hemorrhage  layering within the lateral ventricles similar when compared with  10/29/2024.    There has been interval resolution of the previously noted small  amount of pneumocephalus.    The ventricular system remains nondilated without evidence of  hydrocephalus.    MACRO:  None.    Signed by: Jonathan Rebolledo 10/30/2024 8:24 AM  Dictation workstation:   [id number]  No results found for this or any previous visit.        DATA:  EKG: QTC  Encounter Date: 10/28/24   ECG 12 lead   Result Value    Ventricular Rate 90    Atrial Rate 90    LA Interval 200    QRS Duration 94    QT Interval 402    QTC Calculation(Bazett) 491    P Axis 55    R Axis 92    T Axis 29    QRS Count 15    Q Onset 210    P Onset 110    P Offset 161    T Offset 411    QTC Fredericia 460    Narrative    Normal sinus rhythm  Rightward axis  Prolonged QT  Abnormal ECG  No previous ECGs available  See ED provider note for full interpretation and clinical correlation  Confirmed by Humble Velazquez ([id number]) on 10/29/2024 1:30:10 AM     Anti-psychotics in 48 hours: none    Opioids/Benzodiazepines in 48 hours: none   Anticholinergics on board: No  Restraints: No  Indwelling catheters: No  Last BM: none recorded   UO in 24 hours: voiding   Activity in the past 24 hours: out of bed to chair   Need for ambulatory  devices: none at baseline     Assessment/Plan     Pia Nguyen is a 69 y.o. female with HTN, HLD, PAD s/p left fem and popliteal artery angioplasty 7/2023 on ASA and Plavix, asthma, COPD, hypothyroid, depression/anxiety who presented to [facility] after a fall while intoxicated down 6 steps. Patient found to have SAH, IPH near left silvian fissure, pneumocephalus, left subdural hematoma. CT face with concern for ethmoid sinus fracture. Patient being seen in geriatric consultation for recurrent falls.      1. Acute mixed (hyperactive/hypoactive) delirium in the setting of fall with SAH/SDH, IPH near left silvian fissure and pneumocephalus as well as chronic ETOH use   - CAM positive, 4 AT: 12   - No concerning medications   - PAWS 2 - being monitored   - Consider melatonin 3 mg daily to restore normal sleep-wake cycle   - Agree with scheduled acetaminophen     Please consider the following general measures for minimizing delirium in a hospitalized patient:   -Bright lights during the day, keeps blinds up, switch all lights on   -avoid disturbances at night. Encourage at least 6 hours uninterrupted sleep. Consider d/c 4am vitals check  -avoid benzodiazepines, sedatives. Minimize opioids   -avoid anti-cholinergics    -use low dose olanzapine 5 mg PO (IM if PO not possible) only PRN severe agitation where pt exhibits volatile behavior and is a threat to self or others. EKGs to monitor Qtc. Current Qtc is 491  -daily orientation to time and place by the staff   -out of bed to chair few hours everyday  - encourage stimulating activities during the day if possible     2. L frontal SAH, thin L SDH, IPH near left silvian fissure, pneumocephalus  - Per neurosurgery, CTH stable today  - Neurosurgery recommendation; Keppra x 7 days, hold ASA and Plavix for 7 days. Keep SBP < 160.   - Follow neurochecks     3. Facial fractures  - Plastic surgery consulted with no surgical intervention  - Cold packs for pain  - Sinus precautions      4. Falls  -  reports 2 recent falls, unwitnessed and without injury. He states she doesn't use any assistive devices   -  notes some gait instability  - ETOH likely contributing to fall risk, recommend support to help with stopping   - Labs checked at the end of September show vitamin D level 38, Folate; 13.4, Vitamin B12: 330, T4: 1.3, TSH: 1.08  - Recommend beginning cyanocobalamin 1000 mcg by mouth daily due to low end of normal B12 level  - Recommend checking orthostatic vital signs   - PT/OT evaluation     5. Chronic ETOH use  - Drinks several times per week  - Patient currently on thiamine and folic acid  - Recommend adding cyanocobalamin 1000 mcg daily   - Recommend support to assist with quitting     6. Osteoporosis  - On Boniva   - Vitamin D level stable at 38       Care Transitions:  -Recommended level for discharge: Per PT recommendations  -Home going considerations: lives with  and son in a ranch style home   -Primary care physician: Madi Damon DO     Goals of Care:  -Health care power of : none   -Living will: unsure   Code status: Full code     4M AGE-FRIENDLY INITIATIVE:  What matters most to patient: Caring for others  Medications: none concerning   Mentation: CAM positive, 4 AT: 12   Mobility: Uses no assistive devices at baseline       Geriatric medicine will continue to follow the patient. Thank you for allowing geriatric medicine to be involved in the care of your patient. Geriatric medicine consultation team is available during work hours Monday through Friday. For any emergency issues requiring immediate assistance over the weekend, please page Geriatrics pager [phone]    Consult Billing Time  Prep time on date of patient encounter(minutes): 30   Time directly with patient/family/caregiver(minutes): 30   Documentation time(minutes): 30   TOTAL TIME(minutes): 90     REX Orta-CNP

## 2024-10-30 NOTE — PROGRESS NOTES
Physical Therapy    Physical Therapy Treatment    Patient Name: Pia Nguyen  MRN: [id number]  Department: [place] [address]  Room: [id number]  Today's Date: 10/30/2024  Time Calculation  Start Time: 1036  Stop Time: 1045  Time Calculation (min): 9 min         Assessment/Plan   PT Assessment  Evaluation/Treatment Tolerance: Patient limited by fatigue  End of Session Communication: Bedside nurse  End of Session Patient Position: Bed, 3 rail up, Alarm on     PT Plan  Treatment/Interventions: Bed mobility, Transfer training, Gait training, Stair training, Balance training, Neuromuscular re-education, Strengthening, Endurance training, Range of motion, Therapeutic exercise, Therapeutic activity, Home exercise program  PT Plan: Ongoing PT  PT Frequency: 4 times per week  PT Discharge Recommendations: High intensity level of continued care (TBI)  PT Recommended Transfer Status: Assist x2  PT - OK to Discharge: Yes      General Visit Information:   PT  Visit  PT Received On: 10/30/24  General  Prior to Session Communication: Bedside nurse  Patient Position Received: Bed, 3 rail up  General Comment: Goal of session to perform ambulation/activity from chair. However upon PT entry pt was returning from chair to bed with RN assist. Though pt more participatory this date, she remains impaired cognitively from TBI. Skilled assist provided to progress mobility as able given pt's deficits.    Subjective   Precautions:  Precautions  Medical Precautions: Fall precautions, Spinal precautions  Precautions Comment: Sinus precautions, HOB >30, SBP <160    Vital Signs (Past 2hrs)      Vital Signs Comment: VSS throughout session     Objective   Pain:  Pain Assessment  Pain Assessment: 0-10  0-10 (Numeric) Pain Score:  (Reported HA pain, did not rate)  Cognition:  Cognition  Overall Cognitive Status: Impaired  Orientation Level: Disoriented to place, Disoriented to time, Disoriented to situation  Following Commands: Follows one step commands with  repetition  Cognition Comments: [place] level IV emerging V, expressive deficits noted  Coordination:  Movements are Fluid and Coordinated: No  Coordination Comment: Limited by impulsivity    Activity Tolerance:  Activity Tolerance  Endurance: Tolerates less than 10 min exercise, no significant change in vital signs  Early Mobility/Exercise Safety Screen: Proceed with mobilization - No exclusion criteria met  Activity Tolerance Comments: Anticipate mobility tolerance will improve as pt's mental status progresses from TBI.  Treatments:  Therapeutic Activity  Therapeutic Activity 1: Performed in-bed activity with increased education regarding precautions and mobility given decreased safety awareness + impulsivity.      Bed Mobility 1  Bed Mobility 1: Rolling left, Rolling right  Level of Assistance 1: Minimum assistance  Bed Mobility Comments 1: Max verbal/tactile cues required for mobility  Bed Mobility 2  Bed Mobility  2: Scooting  Level of Assistance 2: Dependent (x2)  Bed Mobility Comments 2: Boosted in bed with draw sheet for optimal positioning with educaiton provided for maintaining HOB >30 deg.    Outcome Measures:  [facility] Basic Mobility  Turning from your back to your side while in a flat bed without using bedrails: A little  Moving from lying on your back to sitting on the side of a flat bed without using bedrails: A little  Moving to and from bed to chair (including a wheelchair): A little  Standing up from a chair using your arms (e.g. wheelchair or bedside chair): A little  To walk in hospital room: Total  Climbing 3-5 steps with railing: Total  Basic Mobility - Total Score: 14    FSS-ICU  Ambulation: Walks <50 feet with any assistance x1 or walks any distance with assistance x2 people  Rolling: Minimal assistance (performs 75% or more of task)  Sitting: Minimal assistance (performs 75% or more of task)  Transfer Sit-to-Stand: Minimal assistance (performs 75% or more of task)  Transfer  Supine-to-Sit: Minimal assistance (performs 75% or more of task)  Total Score: 17      Early Mobility/Exercise Safety Screen: Proceed with mobilization - No exclusion criteria met  ICU Mobility Scale: Sitting in bed, exercises in bed [1]    Education Documentation  Precautions, taught by Brittney Patel PT at 10/30/2024  3:52 PM.  Learner: Patient  Readiness: Acceptance  Method: Explanation  Response: Needs Reinforcement    Body Mechanics, taught by Brittney Patel PT at 10/30/2024  3:52 PM.  Learner: Patient  Readiness: Acceptance  Method: Explanation  Response: Needs Reinforcement    Mobility Training, taught by Brittney Patel PT at 10/30/2024  3:52 PM.  Learner: Patient  Readiness: Acceptance  Method: Explanation  Response: Needs Reinforcement    Education Comments  No comments found.        Encounter Problems       Encounter Problems (Active)       Balance       STG - Maintains dynamic standing balance with upper extremity support on LRAD with Sup using LRAD (Progressing)       Start:  10/29/24    Expected End:  11/19/24            STG - Maintains dynamic sitting balance without upper extremity support with Sup (Progressing)       Start:  10/29/24    Expected End:  11/19/24               Mobility       STG - Patient will ambulate x100 ft with Sup using LRAD (Progressing)       Start:  10/29/24    Expected End:  11/19/24            STG - Patient will ascend and descend 3 stairs with rail vs LRAD with CGA (Progressing)       Start:  10/29/24    Expected End:  11/19/24               PT Transfers       STG - Patient will perform bed mobility with Sup (Progressing)       Start:  10/29/24    Expected End:  11/19/24            STG - Patient will transfer sit to and from stand with Sup using LRAD (Progressing)       Start:  10/29/24    Expected End:  11/19/24               Pain - Adult            Brittney Patel PT, DPT

## 2024-10-30 NOTE — PROGRESS NOTES
Pia Nguyen is a 69 y.o. female on day 2 of admission presenting with SAH (subarachnoid hemorrhage) (Multi).    Subjective   No acute events    Objective     Physical Exam  A&Ox1  Face symmetric  RUE 5/5  LUE 5/5  RLE 5/5  LLE 5/5  Sensation intact to light touch throughout all extremities      Last Recorded Vitals  Blood pressure 143/76, pulse 77, temperature 36.1 °C (97 °F), temperature source Temporal, resp. rate 17, SpO2 100%.  Intake/Output last 3 Shifts:  I/O last 3 completed shifts:  In: 2207.5 [P.O.:240; I.V.:1667.5; IV Piggyback:300]  Out: 50 [Urine:50]    Relevant Results      Assessment/Plan   Principal Problem:    SAH (subarachnoid hemorrhage) (Multi)    Pia is a 69 y.o. female with h/o HTN, HLD, PAD s/p L fem and popliteal artery angioplasty 7/2023, on ASA/PLX, asthma, COPD, hypothyroidism, depression, p/w fall down stairs, +HS. CTH 1.1x1.4 L temporal contusion, thin L frontal and tentorial SDH, L sylvian and basilar cistern SAH, bifrontal scattered pneumocephalus, CTH blossoming contusion, incr temporal and LF SDH, thin R tentorial SDH, L parietal contusion     10/30 CTH stable    PLAN  Trauma primary  Ok for q4 neurochecks   Needs 2 week with repeat CT Head  Recommend keppra for seizure ppx x7d   Hold ASA restart post bleed day 7 and PLX restart at follow up  Goal SBP <160  Hold DVT ppx until PBD 2    Bhargav Gutierrez MD

## 2024-10-31 LAB
ANION GAP SERPL CALC-SCNC: 14 MMOL/L (ref 10–20)
BUN SERPL-MCNC: 11 MG/DL (ref 6–23)
CALCIUM SERPL-MCNC: 8.4 MG/DL (ref 8.6–10.6)
CHLORIDE SERPL-SCNC: 101 MMOL/L (ref 98–107)
CO2 SERPL-SCNC: 23 MMOL/L (ref 21–32)
CREAT SERPL-MCNC: 0.57 MG/DL (ref 0.5–1.05)
EGFRCR SERPLBLD CKD-EPI 2021: >90 ML/MIN/1.73M*2
ERYTHROCYTE [DISTWIDTH] IN BLOOD BY AUTOMATED COUNT: 12.3 % (ref 11.5–14.5)
GLUCOSE SERPL-MCNC: 146 MG/DL (ref 74–99)
HCT VFR BLD AUTO: 30.6 % (ref 36–46)
HGB BLD-MCNC: 10 G/DL (ref 12–16)
MAGNESIUM SERPL-MCNC: 2.17 MG/DL (ref 1.6–2.4)
MCH RBC QN AUTO: 27.9 PG (ref 26–34)
MCHC RBC AUTO-ENTMCNC: 32.7 G/DL (ref 32–36)
MCV RBC AUTO: 85 FL (ref 80–100)
NRBC BLD-RTO: 0 /100 WBCS (ref 0–0)
PLATELET # BLD AUTO: 179 X10*3/UL (ref 150–450)
POTASSIUM SERPL-SCNC: 3.3 MMOL/L (ref 3.5–5.3)
RBC # BLD AUTO: 3.59 X10*6/UL (ref 4–5.2)
SODIUM SERPL-SCNC: 135 MMOL/L (ref 136–145)
WBC # BLD AUTO: 7 X10*3/UL (ref 4.4–11.3)

## 2024-10-31 PROCEDURE — 2500000002 HC RX 250 W HCPCS SELF ADMINISTERED DRUGS (ALT 637 FOR MEDICARE OP, ALT 636 FOR OP/ED): Performed by: NURSE PRACTITIONER

## 2024-10-31 PROCEDURE — 2500000004 HC RX 250 GENERAL PHARMACY W/ HCPCS (ALT 636 FOR OP/ED): Performed by: NURSE PRACTITIONER

## 2024-10-31 PROCEDURE — 36415 COLL VENOUS BLD VENIPUNCTURE: CPT | Performed by: NURSE PRACTITIONER

## 2024-10-31 PROCEDURE — 80048 BASIC METABOLIC PNL TOTAL CA: CPT | Performed by: NURSE PRACTITIONER

## 2024-10-31 PROCEDURE — 2500000001 HC RX 250 WO HCPCS SELF ADMINISTERED DRUGS (ALT 637 FOR MEDICARE OP): Performed by: NURSE PRACTITIONER

## 2024-10-31 PROCEDURE — 85027 COMPLETE CBC AUTOMATED: CPT | Performed by: NURSE PRACTITIONER

## 2024-10-31 PROCEDURE — 2500000005 HC RX 250 GENERAL PHARMACY W/O HCPCS: Performed by: PHYSICIAN ASSISTANT

## 2024-10-31 PROCEDURE — 1100000001 HC PRIVATE ROOM DAILY

## 2024-10-31 PROCEDURE — 97530 THERAPEUTIC ACTIVITIES: CPT | Mod: GP

## 2024-10-31 PROCEDURE — 82374 ASSAY BLOOD CARBON DIOXIDE: CPT | Performed by: NURSE PRACTITIONER

## 2024-10-31 PROCEDURE — 99232 SBSQ HOSP IP/OBS MODERATE 35: CPT

## 2024-10-31 PROCEDURE — 83735 ASSAY OF MAGNESIUM: CPT | Performed by: NURSE PRACTITIONER

## 2024-10-31 PROCEDURE — 2500000001 HC RX 250 WO HCPCS SELF ADMINISTERED DRUGS (ALT 637 FOR MEDICARE OP): Performed by: PHYSICIAN ASSISTANT

## 2024-10-31 RX ADMIN — CARVEDILOL 12.5 MG: 12.5 TABLET, FILM COATED ORAL at 20:48

## 2024-10-31 RX ADMIN — PANTOPRAZOLE SODIUM 40 MG: 40 TABLET, DELAYED RELEASE ORAL at 06:08

## 2024-10-31 RX ADMIN — BUTALBITAL, ACETAMINOPHEN, AND CAFFEINE 1 TABLET: 325; 50; 40 TABLET ORAL at 20:48

## 2024-10-31 RX ADMIN — THIAMINE HCL TAB 100 MG 100 MG: 100 TAB at 08:58

## 2024-10-31 RX ADMIN — ENOXAPARIN SODIUM 30 MG: 30 INJECTION SUBCUTANEOUS at 20:49

## 2024-10-31 RX ADMIN — ACETAMINOPHEN 650 MG: 325 TABLET ORAL at 17:30

## 2024-10-31 RX ADMIN — LEVETIRACETAM 500 MG: 5 INJECTION INTRAVENOUS at 08:45

## 2024-10-31 RX ADMIN — SERTRALINE 75 MG: 50 TABLET, FILM COATED ORAL at 08:58

## 2024-10-31 RX ADMIN — CARVEDILOL 12.5 MG: 12.5 TABLET, FILM COATED ORAL at 08:58

## 2024-10-31 RX ADMIN — ACETAMINOPHEN 650 MG: 325 TABLET ORAL at 08:58

## 2024-10-31 RX ADMIN — CYANOCOBALAMIN TAB 1000 MCG 1000 MCG: 1000 TAB at 08:58

## 2024-10-31 RX ADMIN — Medication 3 MG: at 20:48

## 2024-10-31 RX ADMIN — ENOXAPARIN SODIUM 30 MG: 30 INJECTION SUBCUTANEOUS at 08:59

## 2024-10-31 RX ADMIN — LEVETIRACETAM 500 MG: 5 INJECTION INTRAVENOUS at 22:23

## 2024-10-31 RX ADMIN — Medication 0.4 MG: at 08:58

## 2024-10-31 RX ADMIN — LEVOTHYROXINE SODIUM 100 MCG: 0.1 TABLET ORAL at 06:09

## 2024-10-31 RX ADMIN — OXYCODONE HYDROCHLORIDE 5 MG: 5 TABLET ORAL at 20:48

## 2024-10-31 RX ADMIN — ROSUVASTATIN CALCIUM 40 MG: 20 TABLET, FILM COATED ORAL at 20:48

## 2024-10-31 ASSESSMENT — COGNITIVE AND FUNCTIONAL STATUS - GENERAL
MOVING TO AND FROM BED TO CHAIR: A LITTLE
TURNING FROM BACK TO SIDE WHILE IN FLAT BAD: A LITTLE
DRESSING REGULAR LOWER BODY CLOTHING: A LITTLE
TURNING FROM BACK TO SIDE WHILE IN FLAT BAD: A LITTLE
MOVING FROM LYING ON BACK TO SITTING ON SIDE OF FLAT BED WITH BEDRAILS: A LITTLE
STANDING UP FROM CHAIR USING ARMS: A LITTLE
DAILY ACTIVITIY SCORE: 19
MOBILITY SCORE: 19
HELP NEEDED FOR BATHING: A LITTLE
TOILETING: A LITTLE
WALKING IN HOSPITAL ROOM: A LITTLE
CLIMB 3 TO 5 STEPS WITH RAILING: A LITTLE
STANDING UP FROM CHAIR USING ARMS: A LITTLE
MOVING TO AND FROM BED TO CHAIR: A LITTLE
DRESSING REGULAR UPPER BODY CLOTHING: A LITTLE
CLIMB 3 TO 5 STEPS WITH RAILING: TOTAL
MOBILITY SCORE: 16
MOVING TO AND FROM BED TO CHAIR: A LITTLE
DRESSING REGULAR UPPER BODY CLOTHING: A LITTLE
STANDING UP FROM CHAIR USING ARMS: A LITTLE
WALKING IN HOSPITAL ROOM: A LITTLE
PERSONAL GROOMING: A LITTLE
TOILETING: A LITTLE
CLIMB 3 TO 5 STEPS WITH RAILING: A LITTLE
TURNING FROM BACK TO SIDE WHILE IN FLAT BAD: A LITTLE
PERSONAL GROOMING: A LITTLE
HELP NEEDED FOR BATHING: A LITTLE
MOVING FROM LYING ON BACK TO SITTING ON SIDE OF FLAT BED WITH BEDRAILS: A LITTLE
EATING MEALS: A LITTLE
WALKING IN HOSPITAL ROOM: A LITTLE
DRESSING REGULAR LOWER BODY CLOTHING: A LITTLE
MOBILITY SCORE: 18
DAILY ACTIVITIY SCORE: 18

## 2024-10-31 ASSESSMENT — PAIN SCALES - GENERAL
PAINLEVEL_OUTOF10: 0 - NO PAIN
PAINLEVEL_OUTOF10: 0 - NO PAIN
PAINLEVEL_OUTOF10: 8

## 2024-10-31 ASSESSMENT — PAIN - FUNCTIONAL ASSESSMENT
PAIN_FUNCTIONAL_ASSESSMENT: 0-10

## 2024-10-31 NOTE — PROGRESS NOTES
Subjective          Objective     Current Facility-Administered Medications   Medication Dose Route Frequency Provider Last Rate Last Admin   • acetaminophen (Tylenol) tablet 650 mg  650 mg oral TID Serena Ureña PA-C   650 mg at 10/31/24 0858   • albuterol 90 mcg/actuation inhaler 2 puff  2 puff inhalation q4h PRN CANDIDA Alvarez       • butalbital-acetaminophen-caff -40 mg per tablet 1 tablet  1 tablet oral q4h PRN Serena Ureña PA-C       • carvedilol (Coreg) tablet 12.5 mg  12.5 mg oral BID CANDIDA Alvarez   12.5 mg at 10/31/24 0858   • cyanocobalamin (Vitamin B-12) tablet 1,000 mcg  1,000 mcg oral Daily Serena Ureña PA-C   1,000 mcg at 10/31/24 0858   • enoxaparin (Lovenox) syringe 30 mg  30 mg subcutaneous BID CANDIDA Alvarez   30 mg at 10/31/24 0859   • folic acid (Folvite) tablet 0.4 mg  0.4 mg oral Daily CANDIDA Alvarez   0.4 mg at 10/31/24 0858   • levETIRAcetam (Keppra) 500 mg in sodium chloride (iso)  mL  500 mg intravenous q12h CANDIDA Alvarez   Stopped at 10/31/24 0903   • levothyroxine (Synthroid, Levoxyl) tablet 100 mcg  100 mcg oral Daily CANDIDA Alvarez   100 mcg at 10/31/24 0609   • melatonin tablet 3 mg  3 mg oral Nightly Serena Ureña PA-C   3 mg at 10/30/24 2038   • ondansetron (Zofran) injection 4 mg  4 mg intravenous q6h PRN CANDIDA Alvarez   4 mg at 10/30/24 1055   • oxyCODONE (Roxicodone) immediate release tablet 5 mg  5 mg oral q6h PRN Serena Ureña PA-C       • pantoprazole (ProtoNix) EC tablet 40 mg  40 mg oral Daily before breakfast SUZANNA AlvarezCNP   40 mg at 10/31/24 0608   • rosuvastatin (Crestor) tablet 40 mg  40 mg oral Nightly SUZANNA AlvarezCNP   40 mg at 10/30/24 2038   • sertraline (Zoloft) tablet 75 mg  75 mg oral Daily REX Alvarez-CNP   75 mg at 10/31/24 0858   • thiamine (Vitamin B-1) tablet 100 mg  100 mg oral Daily REX Alvarez-CNP   100 mg at 10/31/24 0858        Physical Exam    Confusion Assessment Method(CAM) for diagnosis of delirium:    1.  Acute onset or fluctuating course: {absent/present:32973:::1}  2.  Inattention: {absent/present:68531:::1}  3.  Disorganized thinking: {absent/present:26251:::1}  4.  Altered level of consciousness: {absent/present:52231:::1}  CAM: {POSITIVE/NEGATIVE:97460}    AT Score For Assessment of Delirium and Cognitive Impairment:    Alertness: {0_4:80891}  Normal(fully alert,but not agitated, throughout assessment)=0  Mild sleepiness for <10 seconds after walking, then normal=0  Clearly abnormal=4  2.  AMT4: {0_1_2:00289}  No mistakes=0  One mistake=1  Two or more mistakes/untestable=2  3.  Attention: {0_1_2:64311}  Achieves seven months or more correctly=0  Starts but scores <7 months/ refuses to start=1  Untestable(cannot start because unwell, drowsy, inattentive)=2  4.  Acute: {0_4:70267}  No=0  Yes=4    Total Score: {NUMBERS 0-4:99489}  4 or above: Possible delirium +/- cognitive impairment  1-3: Possible cognitive impairment  0: Delirium or severe cognitive impairment unlikely(but delirium still possible if (4) information incomplete)      Last Recorded Vitals      10/30/2024     1:00 PM 10/30/2024     2:00 PM 10/30/2024     2:45 PM 10/30/2024     7:35 PM 10/31/2024    12:18 AM 10/31/2024     4:02 AM 10/31/2024     7:00 AM   Vitals   Systolic 146 131 148 132 137 130 107   Diastolic 56 94 76 71 81 71 62   Heart Rate 70 68 76 71 100 71 73   Temp   36.7 °C (98 °F) 36.8 °C (98.2 °F) 36.6 °C (97.9 °F) 36.6 °C (97.9 °F) 36.6 °C (97.8 °F)   Resp 19 20 19 19 19 18 17      There were no vitals filed for this visit.     Relevant Results  Lab Results   Component Value Date    TSH 0.12 (L) 06/10/2020     Results from last 7 days   Lab Units 10/30/24  1450 10/30/24  0115 10/29/24  1211 10/29/24  0222 10/28/24  2348 10/28/24  2234 10/28/24  2109   WBC AUTO x10*3/uL  --  9.0  --  16.1* 16.1* 13.1* 9.6   HEMOGLOBIN g/dL  --  10.5*  --  12.9 12.4  11.9* 12.6   HEMATOCRIT %  --  32.4*  --  39.9 37.2 35.3* 39.1   ALT U/L  --   --   --   --   --  24 26   AST U/L  --   --   --   --   --  36 36   SODIUM mmol/L 130* 132* 134* 134* 131* 132* 133*   POTASSIUM mmol/L 3.7 3.8 4.7 3.4* 3.6 3.4* 3.3*   CHLORIDE mmol/L 98 100 99 97* 98 99 102   CREATININE mg/dL 0.44* 0.49* 0.52 0.54 0.63 0.65 0.79   BUN mg/dL 8 9 8 11 12 13 14   CO2 mmol/L 24 25 22 21 19* 20* 20*   INR   --   --   --   --   --  0.9 1.0          CT head wo IV contrast  Narrative: Interpreted By:  Jonathan Rebolledo,   STUDY:  CT HEAD WO IV CONTRAST;  10/30/2024 5:19 am      INDICATION:  Signs/Symptoms:reevaluation of SAH, SDH, IPH.      COMPARISON:  10/29/2024      ACCESSION NUMBER(S):  [id number]      ORDERING CLINICIAN:  ANGELY CARLOS      TECHNIQUE:  Axial CT images of the head were obtained without intravenous  contrast administration.      FINDINGS:  The CT of the head again demonstrates areas of hyperdense  intraparenchymal hemorrhage centered within left temporal lobe mildly  improved when compared with the prior study dated 10/29/2024. There  is persistent surrounding edema and mass effect contributing to  asymmetric effacement of surrounding sulci and partial effacement of  the adjacent left lateral ventricle.          There is again evidence of similar extra-axial hyperdense hemorrhage  most pronounced surrounding the left temporal lobe which may be  subarachnoid and/or subdural in location. There is again evidence of  mildly prominent hyperdensity along the tentorium left greater than  right suggestive of subdural hemorrhage.      Additional hyperdense subarachnoid hemorrhage is again identified  along sulci of the cerebral hemispheres bilaterally left greater than  right mildly improved when compared with the prior exam dated  10/29/2024.      There is again evidence of hyperdense intraventricular hemorrhage  layering within the lateral ventricles similar when compared with  10/29/2024.       There has been interval resolution of the previously noted small  amount of pneumocephalus.      The ventricular system remains nondilated without evidence of  hydrocephalus.      There is no significant midline shift.      Atherosclerotic calcifications are noted along the carotid siphons.      There is persistent opacification and partial opacification of  scattered ethmoid air cells. There is lobulated mucosal thickening  and/or retention cysts/polyps within the maxillary sinuses and  sphenoid sinus.      There is persistent but improved extracranial soft tissue swelling  noted within the right scalp and face likely posttraumatic in  etiology.          Impression: The CT of the head again demonstrates areas of hyperdense  intraparenchymal hemorrhage centered within left temporal lobe mildly  improved when compared with the prior study dated 10/29/2024. There  is persistent surrounding edema and mass effect contributing to  asymmetric effacement of surrounding sulci and partial effacement of  the adjacent left lateral ventricle.      There is again evidence of similar extra-axial hyperdense hemorrhage  most pronounced surrounding the left temporal lobe which may be  subarachnoid and/or subdural in location. There is again evidence of  mildly prominent hyperdensity along the tentorium left greater than  right suggestive of subdural hemorrhage.      Additional hyperdense subarachnoid hemorrhage is again identified  along sulci of the cerebral hemispheres bilaterally left greater than  right mildly improved when compared with the prior exam dated  10/29/2024.      There is again evidence of hyperdense intraventricular hemorrhage  layering within the lateral ventricles similar when compared with  10/29/2024.      There has been interval resolution of the previously noted small  amount of pneumocephalus.      The ventricular system remains nondilated without evidence of  hydrocephalus.      MACRO:  None.      Signed  by: Jonathan Rebolledo 10/30/2024 8:24 AM  Dictation workstation:   [id number]    {*** HELP TEXT ***    This SmartLink requires parameters for processing. Parameters allow for more information to be given to the SmartLink. This allows you to request specific information by giving the SmartLink precise direction.    The SmartLink accepts a list of result component base names  by commas. You can also request the number of results to display for each component. To indicate the number of results for each component, type the component name followed by a colon and then the number of results (Name:4). For all results for that particular component, type a star in place of the number (Name:*). To obtain the first and last results for a particular component, type FL in place of the number or the star (Name:FL). To obtain the last result for a particular component, type the component name without a colon, number, or star.    Example: .LASTLAB[MCH:3,MCV:*,HCT    Your results may be limited by:    Lookback limit - results older than 72 hours will be excluded.  }      DATA:  EKG: QTC  Encounter Date: 10/28/24   ECG 12 lead   Result Value    Ventricular Rate 90    Atrial Rate 90    CT Interval 200    QRS Duration 94    QT Interval 402    QTC Calculation(Bazett) 491    P Axis 55    R Axis 92    T Axis 29    QRS Count 15    Q Onset 210    P Onset 110    P Offset 161    T Offset 411    QTC Fredericia 460    Narrative    Normal sinus rhythm  Rightward axis  Prolonged QT  Abnormal ECG  No previous ECGs available  See ED provider note for full interpretation and clinical correlation  Confirmed by Humble Velazquez ([id number]) on 10/29/2024 1:30:10 AM      Anti-psychotics in 48 hours: none    Opioids/Benzodiazepines in 48 hours: none   Anticholinergics on board: No  Restraints: No  Indwelling catheters: No  Last BM: none recorded   UO in 24 hours: voiding   Activity in the past 24 hours: out of bed to chair   Need for ambulatory devices:  none at baseline          Assessment/Plan   Pia Nguyen is a 69 y.o. female on day 3 of admission presenting with SAH (subarachnoid hemorrhage) (Multi).    Principal Problem:    SAH (subarachnoid hemorrhage) (Multi)    1. ***  Plan:   2. ***  Plan:  3. ***  Plan:            {This patient does not have an ACP note on file for this encounter, please fill one out - Advance Care Planning Activity :99}    4M AGE-FRIENDLY INITIATIVE:  What matters most to patient:   Medications:   Mentation:   Mobility:     Geriatric medicine will continue to follow the patient. Thank you for allowing geriatric medicine to be involved in the care of your patient. Geriatric medicine consultation team is available during work hours Monday through Friday. For any emergency issues requiring immediate assistance over the weekend, please page Geriatrics pager [phone]    Yohana Hunt MD

## 2024-10-31 NOTE — CARE PLAN
The patient's goals for the shift include      The clinical goals for the shift include pt will remain free from falls

## 2024-10-31 NOTE — PROGRESS NOTES
[facility]  TRAUMA SERVICE - PROGRESS NOTE    Patient Name: Pia Nguyen  MRN: [id number]  Admit Date: 1028  : 1955  AGE: 69 y.o.   GENDER: female  ==============================================================================  MECHANISM OF INJURY:   Fall down 5 stairs  LOC (yes/no?): yes, (+) ETOH  Anticoagulant / Anti-platelet Rx? (for what dx?): DAPT for PVD  Referring Facility Name (N/A for scene EMR run): [facility]     INJURIES:   L frontal SAH  Thin L SDH  IPH near L silvian fissure  pneumocephalus     OTHER MEDICAL PROBLEMS:  PVD  HTN  HLD  GERD  COPD/asthma  hypothyroidism     INCIDENTAL FINDINGS:  Bilateral carotid stenosis     PROCEDURES:  none    ==============================================================================  TODAY'S ASSESSMENT AND PLAN OF CARE:  # L frontal SAD, # thin L SDH, # IPH near L silvian fissure  -Neurosurgery rec: hold DAPT, SBP goal < 160   -Keppra x 7 days   -TBI precautions, HOB > 30, brain rest   -Q4 neurochecks   - will need 2-week follow up with [facility]  -PT/OT rec: high intensity (TBI)  -Fioricet Q4 PRN headaches/migraines  -Scheduled tylenol     #Ethmoid sinus fracture  -Plastics rec: no acute interventon   -no antibiotics    #syncopal workup  - EKG: NSR  - troponin negative  - orthostatics pend    # Comorbidities: hx depression, COPD/Asthma, HTN, HLD, PAD, hypothyroid, osteoporosis  #EtOH  -continue home Zoloft, PRN albuterol, carvedilol, Synthroid, PPI  - hold home Boniva  -hold home ASA/Plavix   - NSGY: ok for ASA post-bleed day 7 ()   - hold plavix until follow up  -thiamine/folate/b12  - Geriatrics consulted for multiple falls at home, rec:    - melatonin 3mg nightly   -cyanocobalamin 1000 mcg daily    -orthostatic vital signs  - PM&R consulted    #FEN/GI/  - Regular diet  - voiding spontaneously    #DVT PPX  -SCDs  -Lvx 30 BID started today (hold until PBD 2)    Dispo: continue RNF, pend TBI rehab    Patient and plan  discussed with attending, Dr. Carbone.      Silvana Nixon PA-C  Trauma, Critical Care, and Acute Care Surgery  Floor: [id number] TSICU: [id number]    Total time of 34 minutes spent reviewing PMH, examining the patient, and documenting in the EMR with with >50% of time spent face to face with patient/family discussing plan of care/management, counseling/educating on disease processes, explaining results of diagnostic testing.    ==============================================================================  CHIEF COMPLAINT / OVERNIGHT EVENTS:   No acute events overnight.    MEDICAL HISTORY / ROS:  Admission history and ROS reviewed. Pertinent changes as follows:  none    PHYSICAL EXAM:  Heart Rate:  []   Temp:  [36.3 °C (97.3 °F)-36.8 °C (98.2 °F)]   Resp:  [17-31]   BP: ()/(56-94)   SpO2:  [92 %-100 %]     Physical Exam  Constitutional:       Appearance: She is not ill-appearing or diaphoretic.   HENT:      Head: Normocephalic.      Mouth/Throat:      Mouth: Mucous membranes are moist.      Pharynx: Oropharynx is clear.   Eyes:      Extraocular Movements: Extraocular movements intact.   Cardiovascular:      Rate and Rhythm: Normal rate.      Pulses: Normal pulses.   Pulmonary:      Effort: Pulmonary effort is normal. No respiratory distress.   Abdominal:      General: There is no distension.      Palpations: Abdomen is soft.      Tenderness: There is no abdominal tenderness.   Musculoskeletal:         General: No swelling, tenderness or deformity.      Cervical back: No tenderness.   Skin:     General: Skin is warm and dry.      Capillary Refill: Capillary refill takes less than 2 seconds.   Neurological:      Mental Status: She is alert.      Sensory: No sensory deficit.      Motor: No weakness.      Comments: AOx2   Psychiatric:         Mood and Affect: Mood normal.         Behavior: Behavior normal.          IMAGING SUMMARY:  no new imaging    LABS:  Results from last 7 days   Lab Units 10/30/24  0116  10/29/24  0222 10/28/24  2348 10/28/24  2234 10/28/24  2109   WBC AUTO x10*3/uL 9.0 16.1* 16.1* 13.1* 9.6   HEMOGLOBIN g/dL 10.5* 12.9 12.4 11.9* 12.6   HEMATOCRIT % 32.4* 39.9 37.2 35.3* 39.1   PLATELETS AUTO x10*3/uL 164 229 189 195 202   NEUTROS PCT AUTO %  --   --   --  76.5 47.4   LYMPHS PCT AUTO %  --   --   --  18.5 45.0   MONOS PCT AUTO %  --   --   --  2.8 4.8   EOS PCT AUTO %  --   --   --  0.7 1.3     Results from last 7 days   Lab Units 10/28/24  2348 10/28/24  2234 10/28/24  2109   APTT seconds 27  --   --    INR   --  0.9 1.0     Results from last 7 days   Lab Units 10/30/24  1450 10/30/24  0115 10/29/24  1211 10/28/24  2348 10/28/24  2234 10/28/24  2109   SODIUM mmol/L 130* 132* 134*   < > 132* 133*   POTASSIUM mmol/L 3.7 3.8 4.7   < > 3.4* 3.3*   CHLORIDE mmol/L 98 100 99   < > 99 102   CO2 mmol/L 24 25 22   < > 20* 20*   BUN mg/dL 8 9 8   < > 13 14   CREATININE mg/dL 0.44* 0.49* 0.52   < > 0.65 0.79   CALCIUM mg/dL 8.1* 8.0* 8.7   < > 8.2* 8.8   PROTEIN TOTAL g/dL  --   --   --   --  7.1 7.1   BILIRUBIN TOTAL mg/dL  --   --   --   --  0.4 0.4   ALK PHOS U/L  --   --   --   --  56 60   ALT U/L  --   --   --   --  24 26   AST U/L  --   --   --   --  36 36   GLUCOSE mg/dL 129* 135* 132*   < > 163* 160*    < > = values in this interval not displayed.     Results from last 7 days   Lab Units 10/28/24  2234 10/28/24  2105   BILIRUBIN TOTAL mg/dL 0.4 0.4           I have reviewed all medications, laboratory results, and imaging pertinent for today's encounter.

## 2024-10-31 NOTE — SIGNIFICANT EVENT
Patient with L temporal contusions and scattered tSAH stable on repeat imaging. No acute neurosurgical intervention or additional neuroimaging needed at this time. Prophylactic anticoagulation allowed on post-bleed day 2. Patient needs follow up in 2 weeks with a CT head at that time, we will arrange. Thank you for allowing us to participate in the care of this patient. Will sign off at this time. Please page [phone] with any questions or concerns.    Arnaldo Sherwood MD  PGY-3 Neurosurgery  6:40 AM

## 2024-10-31 NOTE — PROGRESS NOTES
"Physical Therapy    Physical Therapy Treatment    Patient Name: Pia Nguyen  MRN: [id number]  Department: [place] [address]  Room: [facility]8076  Today's Date: 10/31/2024  Time Calculation  Start Time: 1450  Stop Time: 1504  Time Calculation (min): 14 min         Assessment/Plan   PT Assessment  End of Session Communication: Bedside nurse  Assessment Comment: Patient tolerated session well but requires max encouragement and attention is limited. pt is irritable. Patient is a safety risk due to impulsivity. continue to rec high/TBI rehab.  End of Session Patient Position: Bed, 3 rail up, Alarm on (RN aware)     PT Plan  Treatment/Interventions: Bed mobility, Transfer training, Gait training, Stair training, Balance training, Neuromuscular re-education, Strengthening, Endurance training, Range of motion, Therapeutic exercise, Therapeutic activity, Home exercise program  PT Plan: Ongoing PT  PT Frequency: 4 times per week  PT Discharge Recommendations: High intensity level of continued care (TBI)  PT Recommended Transfer Status: Assist x2  PT - OK to Discharge: Yes      General Visit Information:   PT  Visit  PT Received On: 10/31/24  Response to Previous Treatment: Patient with no complaints from previous session.  General  Prior to Session Communication: Bedside nurse  Patient Position Received: Bed, 3 rail up, Alarm on  General Comment: pt supine in bed, initially refusing therapy depite encouragement. RN then encouraged pt and hesitantly agreed.    Subjective   Precautions:  Precautions  Medical Precautions: Fall precautions, Spinal precautions  Precautions Comment: Sinus precautions, HOB >30, SBP <160          Objective   Pain:  Pain Assessment  Pain Assessment: 0-10  0-10 (Numeric) Pain Score:  (denies pain)  Cognition:  Cognition  Overall Cognitive Status: Impaired  Orientation Level:  (AO to self, place, aware she fell. reported december for month; attempted to reorient and pt stating \"no its not\" and perseverating on " Telephone Encounter by Lukasz Caal MD at 05/31/18 03:25 PM     Author:  Lukasz Caal MD Service:  (none) Author Type:  Physician     Filed:  05/31/18 03:28 PM Encounter Date:  5/31/2018 Status:  Signed     :  Lukasz Caal MD (Physician)            Please inform patient that I discussed his echocardiogram further with the cardiologist who read the test    Would recommend we start him on metoprolol succinate 25 mg daily   #30 with 5 refill    and also we obtain a stress test (stress nuclear) to make sure there are not blockages in his coronary arteries to explain his symptoms  Diagnosis mitral regurgitation and PVCs[MP1.1M]          Revision History        User Key Date/Time User Provider Type Action    > MP1.1 05/31/18 03:28 PM Lukasz Caal MD Physician Sign    M - Manual             this during session adamently refusing it is halloween and october. no further reorientation provided due to agitation)  Cognition Comments: [place] IV emerging V  Attention: Exceptions to WFL  Memory: Exceptions to WFL  Impulsive: Moderately    Activity Tolerance:  Activity Tolerance  Endurance: Tolerates 10 - 20 min exercise with multiple rests  Treatments:  Therapeutic Activity  Therapeutic Activity Performed: Yes  Therapeutic Activity 1: pt completed bed mobility, impulsive and then ambulated around bed 12'. pt offered seated rest break then returned to supine instead of sitting EOB  Therapeutic Activity 2: sup <> sit again as pt then refused to stand back up. repeated sup > sit for third time, stood twice, ambulated 10' x2 to doorway and back, returned to supine    Bed Mobility  Bed Mobility: Yes  Bed Mobility 1  Bed Mobility 1: Supine to sitting, Sitting to supine, Log roll  Level of Assistance 1: Minimum assistance  Bed Mobility Comments 1: assist with trunk, decreased following of spinal precautions due to impulsivity    Ambulation/Gait Training  Ambulation/Gait Training Performed: Yes  Ambulation/Gait Training 1  Surface 1: Level tile  Device 1: Rolling walker  Assistance 1: Minimum assistance, Moderate verbal cues  Quality of Gait 1: Inconsistent stride length, Decreased step length  Comments/Distance (ft) 1: pt ambulated 12' in room, rest. then ambulated 10' x2 with encouragement and saftey cues from impulsivity  Transfers  Transfer: Yes  Transfer 1  Transfer From 1: Sit to, Stand to  Transfer to 1: Stand, Sit  Technique 1: Sit to stand, Stand to sit  Transfer Device 1: Walker  Transfer Level of Assistance 1: Minimum assistance  Trials/Comments 1: stood 3 times from EOB    Outcome Measures:  [facility] Basic Mobility  Turning from your back to your side while in a flat bed without using bedrails: A little  Moving from lying on your back to sitting on the side of a flat bed without using bedrails: A  little  Moving to and from bed to chair (including a wheelchair): A little  Standing up from a chair using your arms (e.g. wheelchair or bedside chair): A little  To walk in hospital room: A little  Climbing 3-5 steps with railing: Total  Basic Mobility - Total Score: 16    Education Documentation  Precautions, taught by Kylee Jones PT at 10/31/2024  3:24 PM.  Learner: Patient  Readiness: Acceptance  Method: Explanation  Response: Needs Reinforcement  Comment: edu on role of PT, dc plan and safety    Mobility Training, taught by yKlee Jones PT at 10/31/2024  3:24 PM.  Learner: Patient  Readiness: Acceptance  Method: Explanation  Response: Needs Reinforcement  Comment: edu on role of PT, dc plan and safety    Education Comments  No comments found.        OP EDUCATION:       Encounter Problems       Encounter Problems (Active)       Balance       STG - Maintains dynamic standing balance with upper extremity support on LRAD with Sup using LRAD (Progressing)       Start:  10/29/24    Expected End:  11/19/24            STG - Maintains dynamic sitting balance without upper extremity support with Sup (Progressing)       Start:  10/29/24    Expected End:  11/19/24               Mobility       STG - Patient will ambulate x100 ft with Sup using LRAD (Progressing)       Start:  10/29/24    Expected End:  11/19/24            STG - Patient will ascend and descend 3 stairs with rail vs LRAD with CGA (Progressing)       Start:  10/29/24    Expected End:  11/19/24               PT Transfers       STG - Patient will perform bed mobility with Sup (Progressing)       Start:  10/29/24    Expected End:  11/19/24            STG - Patient will transfer sit to and from stand with Sup using LRAD (Progressing)       Start:  10/29/24    Expected End:  11/19/24               Pain - Adult

## 2024-10-31 NOTE — PROGRESS NOTES
FABIENNE spoke with patient , Hamilton [phone], regarding discharge planning. FABIENNE explained that patient was recommended for TBI Rehab, which Hamilton was already aware. He requested for a referral to be sent to [facility] for their TBI rehab program. FABIENNE will continue to follow to facilitate discharge plan.       HERIBERTO Hawk

## 2024-11-01 LAB
ANION GAP SERPL CALC-SCNC: 14 MMOL/L (ref 10–20)
BUN SERPL-MCNC: 12 MG/DL (ref 6–23)
CALCIUM SERPL-MCNC: 8.8 MG/DL (ref 8.6–10.6)
CHLORIDE SERPL-SCNC: 102 MMOL/L (ref 98–107)
CO2 SERPL-SCNC: 27 MMOL/L (ref 21–32)
CREAT SERPL-MCNC: 0.62 MG/DL (ref 0.5–1.05)
EGFRCR SERPLBLD CKD-EPI 2021: >90 ML/MIN/1.73M*2
ERYTHROCYTE [DISTWIDTH] IN BLOOD BY AUTOMATED COUNT: 12.6 % (ref 11.5–14.5)
GLUCOSE SERPL-MCNC: 108 MG/DL (ref 74–99)
HCT VFR BLD AUTO: 34.1 % (ref 36–46)
HGB BLD-MCNC: 10.8 G/DL (ref 12–16)
MCH RBC QN AUTO: 27.3 PG (ref 26–34)
MCHC RBC AUTO-ENTMCNC: 31.7 G/DL (ref 32–36)
MCV RBC AUTO: 86 FL (ref 80–100)
NRBC BLD-RTO: 0 /100 WBCS (ref 0–0)
PLATELET # BLD AUTO: 195 X10*3/UL (ref 150–450)
POTASSIUM SERPL-SCNC: 3.7 MMOL/L (ref 3.5–5.3)
RBC # BLD AUTO: 3.95 X10*6/UL (ref 4–5.2)
SODIUM SERPL-SCNC: 139 MMOL/L (ref 136–145)
WBC # BLD AUTO: 6.6 X10*3/UL (ref 4.4–11.3)

## 2024-11-01 PROCEDURE — 2500000001 HC RX 250 WO HCPCS SELF ADMINISTERED DRUGS (ALT 637 FOR MEDICARE OP): Performed by: PHYSICIAN ASSISTANT

## 2024-11-01 PROCEDURE — 99232 SBSQ HOSP IP/OBS MODERATE 35: CPT | Performed by: NURSE PRACTITIONER

## 2024-11-01 PROCEDURE — 85027 COMPLETE CBC AUTOMATED: CPT | Performed by: NURSE PRACTITIONER

## 2024-11-01 PROCEDURE — 2500000001 HC RX 250 WO HCPCS SELF ADMINISTERED DRUGS (ALT 637 FOR MEDICARE OP): Performed by: NURSE PRACTITIONER

## 2024-11-01 PROCEDURE — 1100000001 HC PRIVATE ROOM DAILY

## 2024-11-01 PROCEDURE — 2500000005 HC RX 250 GENERAL PHARMACY W/O HCPCS: Performed by: PHYSICIAN ASSISTANT

## 2024-11-01 PROCEDURE — 97530 THERAPEUTIC ACTIVITIES: CPT | Mod: GP

## 2024-11-01 PROCEDURE — 2500000002 HC RX 250 W HCPCS SELF ADMINISTERED DRUGS (ALT 637 FOR MEDICARE OP, ALT 636 FOR OP/ED): Performed by: NURSE PRACTITIONER

## 2024-11-01 PROCEDURE — 2500000004 HC RX 250 GENERAL PHARMACY W/ HCPCS (ALT 636 FOR OP/ED): Performed by: NURSE PRACTITIONER

## 2024-11-01 PROCEDURE — 80048 BASIC METABOLIC PNL TOTAL CA: CPT | Performed by: NURSE PRACTITIONER

## 2024-11-01 PROCEDURE — 36415 COLL VENOUS BLD VENIPUNCTURE: CPT | Performed by: NURSE PRACTITIONER

## 2024-11-01 RX ADMIN — OXYCODONE HYDROCHLORIDE 5 MG: 5 TABLET ORAL at 10:39

## 2024-11-01 RX ADMIN — BUTALBITAL, ACETAMINOPHEN, AND CAFFEINE 1 TABLET: 325; 50; 40 TABLET ORAL at 09:22

## 2024-11-01 RX ADMIN — LEVETIRACETAM 500 MG: 5 INJECTION INTRAVENOUS at 22:54

## 2024-11-01 RX ADMIN — ACETAMINOPHEN 650 MG: 325 TABLET ORAL at 09:17

## 2024-11-01 RX ADMIN — ENOXAPARIN SODIUM 30 MG: 30 INJECTION SUBCUTANEOUS at 09:20

## 2024-11-01 RX ADMIN — SERTRALINE 75 MG: 50 TABLET, FILM COATED ORAL at 09:19

## 2024-11-01 RX ADMIN — CARVEDILOL 12.5 MG: 12.5 TABLET, FILM COATED ORAL at 09:16

## 2024-11-01 RX ADMIN — ENOXAPARIN SODIUM 30 MG: 30 INJECTION SUBCUTANEOUS at 22:32

## 2024-11-01 RX ADMIN — ACETAMINOPHEN 650 MG: 325 TABLET ORAL at 16:57

## 2024-11-01 RX ADMIN — CYANOCOBALAMIN TAB 1000 MCG 1000 MCG: 1000 TAB at 09:19

## 2024-11-01 RX ADMIN — Medication 3 MG: at 22:32

## 2024-11-01 RX ADMIN — Medication 0.4 MG: at 09:20

## 2024-11-01 RX ADMIN — LEVOTHYROXINE SODIUM 100 MCG: 0.1 TABLET ORAL at 05:27

## 2024-11-01 RX ADMIN — BUTALBITAL, ACETAMINOPHEN, AND CAFFEINE 1 TABLET: 325; 50; 40 TABLET ORAL at 22:41

## 2024-11-01 RX ADMIN — LEVETIRACETAM 500 MG: 5 INJECTION INTRAVENOUS at 09:20

## 2024-11-01 RX ADMIN — THIAMINE HCL TAB 100 MG 100 MG: 100 TAB at 09:19

## 2024-11-01 RX ADMIN — CARVEDILOL 12.5 MG: 12.5 TABLET, FILM COATED ORAL at 22:32

## 2024-11-01 RX ADMIN — PANTOPRAZOLE SODIUM 40 MG: 40 TABLET, DELAYED RELEASE ORAL at 06:04

## 2024-11-01 RX ADMIN — ROSUVASTATIN CALCIUM 40 MG: 20 TABLET, FILM COATED ORAL at 22:32

## 2024-11-01 ASSESSMENT — PAIN SCALES - PAIN ASSESSMENT IN ADVANCED DEMENTIA (PAINAD)
FACIALEXPRESSION: SMILING OR INEXPRESSIVE
TOTALSCORE: MEDICATION (SEE MAR)
CONSOLABILITY: NO NEED TO CONSOLE
TOTALSCORE: 0
TOTALSCORE: MEDICATION (SEE MAR)
BREATHING: NORMAL
TOTALSCORE: 0
BREATHING: NORMAL
BODYLANGUAGE: RELAXED
CONSOLABILITY: NO NEED TO CONSOLE
BODYLANGUAGE: RELAXED
FACIALEXPRESSION: SMILING OR INEXPRESSIVE

## 2024-11-01 ASSESSMENT — PAIN DESCRIPTION - ORIENTATION
ORIENTATION: MID
ORIENTATION: MID

## 2024-11-01 ASSESSMENT — COGNITIVE AND FUNCTIONAL STATUS - GENERAL
MOVING TO AND FROM BED TO CHAIR: A LITTLE
TURNING FROM BACK TO SIDE WHILE IN FLAT BAD: A LITTLE
HELP NEEDED FOR BATHING: A LITTLE
CLIMB 3 TO 5 STEPS WITH RAILING: TOTAL
STANDING UP FROM CHAIR USING ARMS: A LITTLE
DAILY ACTIVITIY SCORE: 18
MOVING FROM LYING ON BACK TO SITTING ON SIDE OF FLAT BED WITH BEDRAILS: A LITTLE
MOVING TO AND FROM BED TO CHAIR: A LITTLE
MOBILITY SCORE: 18
MOBILITY SCORE: 16
CLIMB 3 TO 5 STEPS WITH RAILING: A LITTLE
PERSONAL GROOMING: A LITTLE
TURNING FROM BACK TO SIDE WHILE IN FLAT BAD: A LITTLE
TOILETING: A LITTLE
TURNING FROM BACK TO SIDE WHILE IN FLAT BAD: A LITTLE
STANDING UP FROM CHAIR USING ARMS: A LITTLE
HELP NEEDED FOR BATHING: A LITTLE
PERSONAL GROOMING: A LITTLE
MOBILITY SCORE: 18
DRESSING REGULAR LOWER BODY CLOTHING: A LITTLE
EATING MEALS: A LITTLE
WALKING IN HOSPITAL ROOM: A LITTLE
DRESSING REGULAR UPPER BODY CLOTHING: A LITTLE
WALKING IN HOSPITAL ROOM: A LITTLE
STANDING UP FROM CHAIR USING ARMS: A LITTLE
DRESSING REGULAR LOWER BODY CLOTHING: A LITTLE
DRESSING REGULAR UPPER BODY CLOTHING: A LITTLE
MOVING TO AND FROM BED TO CHAIR: A LITTLE
WALKING IN HOSPITAL ROOM: A LITTLE
DAILY ACTIVITIY SCORE: 19
MOVING FROM LYING ON BACK TO SITTING ON SIDE OF FLAT BED WITH BEDRAILS: A LITTLE
MOVING FROM LYING ON BACK TO SITTING ON SIDE OF FLAT BED WITH BEDRAILS: A LITTLE
CLIMB 3 TO 5 STEPS WITH RAILING: A LITTLE
TOILETING: A LITTLE

## 2024-11-01 ASSESSMENT — PAIN - FUNCTIONAL ASSESSMENT
PAIN_FUNCTIONAL_ASSESSMENT: 0-10

## 2024-11-01 ASSESSMENT — PAIN SCALES - GENERAL
PAINLEVEL_OUTOF10: 10 - WORST POSSIBLE PAIN
PAINLEVEL_OUTOF10: 0 - NO PAIN
PAINLEVEL_OUTOF10: 0 - NO PAIN

## 2024-11-01 ASSESSMENT — PAIN DESCRIPTION - LOCATION
LOCATION: HEAD
LOCATION: HEAD

## 2024-11-01 ASSESSMENT — PAIN SCALES - WONG BAKER
WONGBAKER_NUMERICALRESPONSE: HURTS LITTLE MORE
WONGBAKER_NUMERICALRESPONSE: HURTS LITTLE BIT

## 2024-11-01 NOTE — CARE PLAN
The patient's goals for the shift include      The clinical goals for the shift include Patient will remain free from falls throughout the shift.

## 2024-11-01 NOTE — PROGRESS NOTES
[facility]  TRAUMA SERVICE - PROGRESS NOTE    Patient Name: Pia Nguyen  MRN: [id number]  Admit Date: 1028  : 1955  AGE: 69 y.o.   GENDER: female  ==============================================================================  MECHANISM OF INJURY:   Fall down 5 stairs  LOC (yes/no?): yes, (+) ETOH  Anticoagulant / Anti-platelet Rx? (for what dx?): DAPT for PVD  Referring Facility Name (N/A for scene EMR run): [facility]     INJURIES:   L frontal SAH  Thin L SDH  IPH near L silvian fissure  pneumocephalus     OTHER MEDICAL PROBLEMS:  PVD  HTN  HLD  GERD  COPD/asthma  hypothyroidism     INCIDENTAL FINDINGS:  Bilateral carotid stenosis     PROCEDURES:  none    ==============================================================================  TODAY'S ASSESSMENT AND PLAN OF CARE:  # L frontal SAD, # thin L SDH, # IPH near L silvian fissure  -Neurosurgery rec: hold DAPT, SBP goal < 160   -Keppra x 7 days   -TBI precautions, HOB > 30, brain rest   -Q4 neurochecks   - will need 2-week follow up with [facility]  -PT/OT rec: high intensity (TBI)  -Fioricet Q4 PRN headaches/migraines  -Scheduled tylenol     #Ethmoid sinus fracture  -Plastics rec: no acute interventon   -no antibiotics    #syncopal workup  - EKG: NSR  - troponin negative  - orthostatics pend    # Comorbidities: hx depression, COPD/Asthma, HTN, HLD, PAD, hypothyroid, osteoporosis  #EtOH  -continue home Zoloft, PRN albuterol, carvedilol, Synthroid, PPI  - hold home Boniva  -hold home ASA/Plavix   - NSGY: ok for ASA post-bleed day 7 ()   - hold plavix until follow up  -thiamine/folate/b12  - Geriatrics consulted for multiple falls at home, rec:    - melatonin 3mg nightly   -cyanocobalamin 1000 mcg daily    -orthostatic vital signs  - PM&R consulted    #FEN/GI/  - Regular diet  - voiding spontaneously    #DVT PPX  -SCDs  -Lvx 30 BID    Dispo: Continue care on service. Discharge scheduled for  @1100     Patient discussed  with attending, Dr. Carbone.      Chad Adam PA-C  Trauma, Critical Care, and Acute Care Surgery  Floor: [phone] ICU: [id number]    ==============================================================================  CHIEF COMPLAINT / OVERNIGHT EVENTS:   No acute events overnight. Patient reports continued headaches. Neuro intact.     MEDICAL HISTORY / ROS:  Admission history and ROS reviewed. Pertinent changes as follows:  none    PHYSICAL EXAM:  Heart Rate:  [67-73]   Temp:  [36.5 °C (97.7 °F)-37.6 °C (99.7 °F)]   Resp:  [17-20]   BP: (110-162)/(64-77)   SpO2:  [93 %-95 %]     Physical Exam  Constitutional:       Appearance: She is not ill-appearing or diaphoretic.   HENT:      Head: Normocephalic.      Mouth/Throat:      Mouth: Mucous membranes are moist.      Pharynx: Oropharynx is clear.   Eyes:      Extraocular Movements: Extraocular movements intact.   Cardiovascular:      Rate and Rhythm: Normal rate.      Pulses: Normal pulses.   Pulmonary:      Effort: Pulmonary effort is normal. No respiratory distress.   Abdominal:      General: There is no distension.      Palpations: Abdomen is soft.      Tenderness: There is no abdominal tenderness.   Musculoskeletal:         General: No swelling, tenderness or deformity.      Cervical back: No tenderness.   Skin:     General: Skin is warm and dry.      Capillary Refill: Capillary refill takes less than 2 seconds.   Neurological:      Mental Status: She is alert and oriented to person, place, and time.      GCS: GCS eye subscore is 4. GCS verbal subscore is 5. GCS motor subscore is 6.      Sensory: Sensation is intact. No sensory deficit.      Motor: Motor function is intact. No weakness.   Psychiatric:         Mood and Affect: Mood normal.         Behavior: Behavior normal.          IMAGING SUMMARY:  no new imaging    LABS:  Results from last 7 days   Lab Units 10/31/24  1204 10/30/24  0115 10/29/24  0222 10/28/24  2348 10/28/24  2234 10/28/24  2109   WBC AUTO x10*3/uL  7.0 9.0 16.1*   < > 13.1* 9.6   HEMOGLOBIN g/dL 10.0* 10.5* 12.9   < > 11.9* 12.6   HEMATOCRIT % 30.6* 32.4* 39.9   < > 35.3* 39.1   PLATELETS AUTO x10*3/uL 179 164 229   < > 195 202   NEUTROS PCT AUTO %  --   --   --   --  76.5 47.4   LYMPHS PCT AUTO %  --   --   --   --  18.5 45.0   MONOS PCT AUTO %  --   --   --   --  2.8 4.8   EOS PCT AUTO %  --   --   --   --  0.7 1.3    < > = values in this interval not displayed.     Results from last 7 days   Lab Units 10/28/24  2348 10/28/24  2234 10/28/24  2109   APTT seconds 27  --   --    INR   --  0.9 1.0     Results from last 7 days   Lab Units 10/31/24  1204 10/30/24  1450 10/30/24  0115 10/28/24  2348 10/28/24  2234 10/28/24  2109   SODIUM mmol/L 135* 130* 132*   < > 132* 133*   POTASSIUM mmol/L 3.3* 3.7 3.8   < > 3.4* 3.3*   CHLORIDE mmol/L 101 98 100   < > 99 102   CO2 mmol/L 23 24 25   < > 20* 20*   BUN mg/dL 11 8 9   < > 13 14   CREATININE mg/dL 0.57 0.44* 0.49*   < > 0.65 0.79   CALCIUM mg/dL 8.4* 8.1* 8.0*   < > 8.2* 8.8   PROTEIN TOTAL g/dL  --   --   --   --  7.1 7.1   BILIRUBIN TOTAL mg/dL  --   --   --   --  0.4 0.4   ALK PHOS U/L  --   --   --   --  56 60   ALT U/L  --   --   --   --  24 26   AST U/L  --   --   --   --  36 36   GLUCOSE mg/dL 146* 129* 135*   < > 163* 160*    < > = values in this interval not displayed.     Results from last 7 days   Lab Units 10/28/24  2234 10/28/24  2109   BILIRUBIN TOTAL mg/dL 0.4 0.4           I have reviewed all medications, laboratory results, and imaging pertinent for today's encounter.

## 2024-11-01 NOTE — CARE PLAN
The patient's goals for the shift include      The clinical goals for the shift include Patient will remain free from falls throughout the shift.    Over the shift, the patient will continue to progress towards her care plan goals

## 2024-11-01 NOTE — PROGRESS NOTES
Physical Therapy    Physical Therapy Treatment    Patient Name: Pia Nguyen  MRN: [id number]  Department: [place] [address]  Room: [facility]8076-  Today's Date: 11/1/2024  Time Calculation  Start Time: 0928  Stop Time: 1001  Time Calculation (min): 33 min         Assessment/Plan   PT Assessment  End of Session Communication: Bedside nurse  Assessment Comment: Patient tolerated session well with encouragement. Pt with headache during session and limited further activity. edu pt and family on up in chair for all meals with floor staff assistance.  End of Session Patient Position: Up in chair, Alarm on (sitter)     PT Plan  Treatment/Interventions: Bed mobility, Transfer training, Gait training, Stair training, Balance training, Neuromuscular re-education, Strengthening, Endurance training, Range of motion, Therapeutic exercise, Therapeutic activity, Home exercise program  PT Plan: Ongoing PT  PT Frequency: 4 times per week  PT Discharge Recommendations: High intensity level of continued care (TBI)  PT Recommended Transfer Status: Assist x1  PT - OK to Discharge: Yes      General Visit Information:   PT  Visit  PT Received On: 11/01/24  Response to Previous Treatment: Patient with no complaints from previous session.  General  Family/Caregiver Present: Yes  Caregiver Feedback: daughter and  present, supportive  Prior to Session Communication: Bedside nurse  Patient Position Received: Bed, 3 rail up, Alarm on (sitter)  General Comment: pt supine in bed, RN cleared.  pt required encouragement to participate    Subjective   Precautions:  Precautions  Medical Precautions: Fall precautions, Spinal precautions  Precautions Comment: Sinus precautions, HOB >30, SBP <160      Vital Signs Comment: SpO2 96%, [id number]     Objective   Pain:  Pain Assessment  Pain Assessment: 0-10  0-10 (Numeric) Pain Score:  (reports headache but not rated)  Pain Interventions: Repositioned  Cognition:  Cognition  Overall Cognitive Status:  Impaired  Orientation Level: Disoriented to time  Cognition Comments: ranch[place] amigos IV emerging V  Attention: Exceptions to WFL  Memory: Exceptions to WFL  Impulsive: Moderately    Activity Tolerance:  Activity Tolerance  Endurance: Tolerates 10 - 20 min exercise with multiple rests  Treatments:  Therapeutic Activity  Therapeutic Activity Performed: Yes  Therapeutic Activity 1: pt completed bed mobility, ambulated to rest room then back to bed. pt reports headache.  Therapeutic Activity 2: with encouragement, pt completed bed mobility again and ambulated to chair.    Bed Mobility  Bed Mobility: Yes  Bed Mobility 1  Bed Mobility 1: Supine to sitting, Sitting to supine, Log roll  Level of Assistance 1: Minimum assistance  Bed Mobility Comments 1: assist with trunk. 2 sets completed    Ambulation/Gait Training  Ambulation/Gait Training Performed: Yes  Ambulation/Gait Training 1  Surface 1: Level tile  Device 1: Rolling walker  Assistance 1: Contact guard, Minimal verbal cues  Quality of Gait 1: Inconsistent stride length, Decreased step length  Comments/Distance (ft) 1: impulsive, cues for walker management. ambulated 10' x2 with seated rest break. then 2' to chair  Transfers  Transfer: Yes  Transfer 1  Transfer From 1: Sit to, Stand to  Transfer to 1: Stand, Sit  Technique 1: Sit to stand, Stand to sit  Transfer Device 1: Walker  Transfer Level of Assistance 1: Minimum assistance  Trials/Comments 1: stood from EOB twice, and toilet. cues for hand placement    Outcome Measures:  [facility] Basic Mobility  Turning from your back to your side while in a flat bed without using bedrails: A little  Moving from lying on your back to sitting on the side of a flat bed without using bedrails: A little  Moving to and from bed to chair (including a wheelchair): A little  Standing up from a chair using your arms (e.g. wheelchair or bedside chair): A little  To walk in hospital room: A little  Climbing 3-5 steps with railing:  Total  Basic Mobility - Total Score: 16    Education Documentation  Precautions, taught by Kylee Jones PT at 11/1/2024 11:59 AM.  Learner: Patient  Readiness: Acceptance  Method: Explanation  Response: Verbalizes Understanding, Needs Reinforcement  Comment: edu pt and family on OOB to chair for all meals with floor staff assist, amb to restroom with staff assist    Mobility Training, taught by Kylee Jones PT at 11/1/2024 11:59 AM.  Learner: Patient  Readiness: Acceptance  Method: Explanation  Response: Verbalizes Understanding, Needs Reinforcement  Comment: edu pt and family on OOB to chair for all meals with floor staff assist, amb to restroom with staff assist    Education Comments  No comments found.        OP EDUCATION:       Encounter Problems       Encounter Problems (Active)       Balance       STG - Maintains dynamic standing balance with upper extremity support on LRAD with Sup using LRAD (Progressing)       Start:  10/29/24    Expected End:  11/19/24            STG - Maintains dynamic sitting balance without upper extremity support with Sup (Progressing)       Start:  10/29/24    Expected End:  11/19/24               Mobility       STG - Patient will ambulate x100 ft with Sup using LRAD (Progressing)       Start:  10/29/24    Expected End:  11/19/24            STG - Patient will ascend and descend 3 stairs with rail vs LRAD with CGA (Progressing)       Start:  10/29/24    Expected End:  11/19/24               PT Transfers       STG - Patient will perform bed mobility with Sup (Progressing)       Start:  10/29/24    Expected End:  11/19/24            STG - Patient will transfer sit to and from stand with Sup using LRAD (Progressing)       Start:  10/29/24    Expected End:  11/19/24               Pain - Adult

## 2024-11-01 NOTE — DOCUMENTATION CLARIFICATION NOTE
"    PATIENT:               SELINA MENDIOLA  ACCT #:                  [id number]  MRN:                       [id number]  :                       1955  ADMIT DATE:       10/28/2024 10:31 PM  DISCH DATE:  RESPONDING PROVIDER #:        [id number]          PROVIDER RESPONSE TEXT:    hyponatremia is clinically significant and required treatment/monitoring    CDI QUERY TEXT:    Clarification        Instruction:    Based on your assessment of the patient and the clinical information, please provide the requested documentation by clicking on the appropriate radio button and enter any additional information if prompted.    Question: Is there a diagnosis indicative of the lab values or image study    When answering this query, please exercise your independent professional judgment. The fact that a question is being asked, does not imply that any particular answer is desired or expected.    The patient's clinical indicators include:  Clinical Information: 10/29/2024 DPN:\"69 y.o. female admitted to the ICU s/p fall with SAH, SDH, and IPH with need for q1hr neurochecks. Repeat CT head this AM stable from prior. CT face with concern for ethmoid sinus fracture.\"    Clinical Indicators:  10/29/2024  Na+  134   10/30/2024  Na+  130    Treatment: daily RFP, NS@75cc    Risk Factors: ETOH abuse, NPO, TBI  Options provided:  -- hyponatremia is clinically significant and required treatment/monitoring  -- low sodium not requiring treatment or evaluation  -- Other - I will add my own diagnosis  -- Refer to Clinical Documentation Reviewer    Query created by: Danielle Graff on 10/31/2024 8:14 AM      Electronically signed by:  FABIOLA CARRASCO MD 2024 7:57 AM          "

## 2024-11-02 ENCOUNTER — APPOINTMENT (OUTPATIENT)
Dept: RADIOLOGY | Facility: HOSPITAL | Age: 69
DRG: 083 | End: 2024-11-02
Payer: MEDICARE

## 2024-11-02 LAB
ERYTHROCYTE [DISTWIDTH] IN BLOOD BY AUTOMATED COUNT: 12.5 % (ref 11.5–14.5)
HCT VFR BLD AUTO: 35.1 % (ref 36–46)
HGB BLD-MCNC: 11 G/DL (ref 12–16)
MCH RBC QN AUTO: 27.3 PG (ref 26–34)
MCHC RBC AUTO-ENTMCNC: 31.3 G/DL (ref 32–36)
MCV RBC AUTO: 87 FL (ref 80–100)
NRBC BLD-RTO: 0 /100 WBCS (ref 0–0)
PLATELET # BLD AUTO: 179 X10*3/UL (ref 150–450)
RBC # BLD AUTO: 4.03 X10*6/UL (ref 4–5.2)
WBC # BLD AUTO: 6.5 X10*3/UL (ref 4.4–11.3)

## 2024-11-02 PROCEDURE — 2500000004 HC RX 250 GENERAL PHARMACY W/ HCPCS (ALT 636 FOR OP/ED): Performed by: NURSE PRACTITIONER

## 2024-11-02 PROCEDURE — 70480 CT ORBIT/EAR/FOSSA W/O DYE: CPT | Performed by: RADIOLOGY

## 2024-11-02 PROCEDURE — 2500000004 HC RX 250 GENERAL PHARMACY W/ HCPCS (ALT 636 FOR OP/ED): Performed by: SURGERY

## 2024-11-02 PROCEDURE — 2500000001 HC RX 250 WO HCPCS SELF ADMINISTERED DRUGS (ALT 637 FOR MEDICARE OP): Performed by: NURSE PRACTITIONER

## 2024-11-02 PROCEDURE — 2500000002 HC RX 250 W HCPCS SELF ADMINISTERED DRUGS (ALT 637 FOR MEDICARE OP, ALT 636 FOR OP/ED): Performed by: NURSE PRACTITIONER

## 2024-11-02 PROCEDURE — 2500000001 HC RX 250 WO HCPCS SELF ADMINISTERED DRUGS (ALT 637 FOR MEDICARE OP): Performed by: PHYSICIAN ASSISTANT

## 2024-11-02 PROCEDURE — 1100000001 HC PRIVATE ROOM DAILY

## 2024-11-02 PROCEDURE — 70450 CT HEAD/BRAIN W/O DYE: CPT

## 2024-11-02 PROCEDURE — 36415 COLL VENOUS BLD VENIPUNCTURE: CPT | Performed by: NURSE PRACTITIONER

## 2024-11-02 PROCEDURE — 70450 CT HEAD/BRAIN W/O DYE: CPT | Performed by: RADIOLOGY

## 2024-11-02 PROCEDURE — 99231 SBSQ HOSP IP/OBS SF/LOW 25: CPT | Performed by: SURGERY

## 2024-11-02 PROCEDURE — 70480 CT ORBIT/EAR/FOSSA W/O DYE: CPT

## 2024-11-02 PROCEDURE — 2500000005 HC RX 250 GENERAL PHARMACY W/O HCPCS: Performed by: PHYSICIAN ASSISTANT

## 2024-11-02 PROCEDURE — 85027 COMPLETE CBC AUTOMATED: CPT | Performed by: NURSE PRACTITIONER

## 2024-11-02 RX ADMIN — BUTALBITAL, ACETAMINOPHEN, AND CAFFEINE 1 TABLET: 325; 50; 40 TABLET ORAL at 23:08

## 2024-11-02 RX ADMIN — Medication 3 MG: at 22:30

## 2024-11-02 RX ADMIN — SERTRALINE 75 MG: 50 TABLET, FILM COATED ORAL at 08:21

## 2024-11-02 RX ADMIN — CARVEDILOL 12.5 MG: 12.5 TABLET, FILM COATED ORAL at 08:22

## 2024-11-02 RX ADMIN — ACETAMINOPHEN 650 MG: 325 TABLET ORAL at 12:44

## 2024-11-02 RX ADMIN — LEVETIRACETAM 500 MG: 5 INJECTION INTRAVENOUS at 22:30

## 2024-11-02 RX ADMIN — SODIUM CHLORIDE 1000 ML: 9 INJECTION, SOLUTION INTRAVENOUS at 10:32

## 2024-11-02 RX ADMIN — OXYCODONE HYDROCHLORIDE 5 MG: 5 TABLET ORAL at 18:18

## 2024-11-02 RX ADMIN — THIAMINE HCL TAB 100 MG 100 MG: 100 TAB at 08:21

## 2024-11-02 RX ADMIN — BUTALBITAL, ACETAMINOPHEN, AND CAFFEINE 1 TABLET: 325; 50; 40 TABLET ORAL at 18:17

## 2024-11-02 RX ADMIN — Medication 0.4 MG: at 08:21

## 2024-11-02 RX ADMIN — LEVOTHYROXINE SODIUM 100 MCG: 0.1 TABLET ORAL at 07:06

## 2024-11-02 RX ADMIN — BUTALBITAL, ACETAMINOPHEN, AND CAFFEINE 1 TABLET: 325; 50; 40 TABLET ORAL at 03:16

## 2024-11-02 RX ADMIN — BUTALBITAL, ACETAMINOPHEN, AND CAFFEINE 1 TABLET: 325; 50; 40 TABLET ORAL at 08:26

## 2024-11-02 RX ADMIN — ACETAMINOPHEN 650 MG: 325 TABLET ORAL at 08:22

## 2024-11-02 RX ADMIN — CARVEDILOL 12.5 MG: 12.5 TABLET, FILM COATED ORAL at 22:30

## 2024-11-02 RX ADMIN — CYANOCOBALAMIN TAB 1000 MCG 1000 MCG: 1000 TAB at 08:22

## 2024-11-02 RX ADMIN — OXYCODONE HYDROCHLORIDE 5 MG: 5 TABLET ORAL at 09:40

## 2024-11-02 RX ADMIN — ROSUVASTATIN CALCIUM 40 MG: 20 TABLET, FILM COATED ORAL at 22:30

## 2024-11-02 RX ADMIN — LEVETIRACETAM 500 MG: 5 INJECTION INTRAVENOUS at 10:33

## 2024-11-02 RX ADMIN — PANTOPRAZOLE SODIUM 40 MG: 40 TABLET, DELAYED RELEASE ORAL at 07:06

## 2024-11-02 RX ADMIN — ACETAMINOPHEN 650 MG: 325 TABLET ORAL at 18:18

## 2024-11-02 RX ADMIN — ENOXAPARIN SODIUM 30 MG: 30 INJECTION SUBCUTANEOUS at 08:24

## 2024-11-02 ASSESSMENT — COGNITIVE AND FUNCTIONAL STATUS - GENERAL
DAILY ACTIVITIY SCORE: 18
DRESSING REGULAR LOWER BODY CLOTHING: A LITTLE
DAILY ACTIVITIY SCORE: 18
WALKING IN HOSPITAL ROOM: A LITTLE
MOVING TO AND FROM BED TO CHAIR: A LITTLE
MOBILITY SCORE: 18
DRESSING REGULAR UPPER BODY CLOTHING: A LITTLE
CLIMB 3 TO 5 STEPS WITH RAILING: A LITTLE
STANDING UP FROM CHAIR USING ARMS: A LITTLE
TURNING FROM BACK TO SIDE WHILE IN FLAT BAD: A LITTLE
DRESSING REGULAR LOWER BODY CLOTHING: A LITTLE
MOVING FROM LYING ON BACK TO SITTING ON SIDE OF FLAT BED WITH BEDRAILS: A LITTLE
EATING MEALS: A LITTLE
PERSONAL GROOMING: A LITTLE
HELP NEEDED FOR BATHING: A LITTLE
TOILETING: A LITTLE
DRESSING REGULAR UPPER BODY CLOTHING: A LITTLE
MOVING TO AND FROM BED TO CHAIR: A LITTLE
WALKING IN HOSPITAL ROOM: A LITTLE
MOBILITY SCORE: 18
STANDING UP FROM CHAIR USING ARMS: A LITTLE
CLIMB 3 TO 5 STEPS WITH RAILING: A LITTLE
PERSONAL GROOMING: A LITTLE
TURNING FROM BACK TO SIDE WHILE IN FLAT BAD: A LITTLE
EATING MEALS: A LITTLE
TOILETING: A LITTLE
MOVING FROM LYING ON BACK TO SITTING ON SIDE OF FLAT BED WITH BEDRAILS: A LITTLE
HELP NEEDED FOR BATHING: A LITTLE

## 2024-11-02 ASSESSMENT — PAIN DESCRIPTION - ORIENTATION
ORIENTATION: MID
ORIENTATION: RIGHT;POSTERIOR

## 2024-11-02 ASSESSMENT — PAIN SCALES - PAIN ASSESSMENT IN ADVANCED DEMENTIA (PAINAD)
CONSOLABILITY: NO NEED TO CONSOLE
BODYLANGUAGE: RELAXED
BODYLANGUAGE: RELAXED
CONSOLABILITY: NO NEED TO CONSOLE
FACIALEXPRESSION: SMILING OR INEXPRESSIVE
TOTALSCORE: 0
CONSOLABILITY: NO NEED TO CONSOLE
TOTALSCORE: MEDICATION (SEE MAR)
BREATHING: NORMAL
FACIALEXPRESSION: SMILING OR INEXPRESSIVE
TOTALSCORE: HEAT APPLIED
TOTALSCORE: MEDICATION (SEE MAR)
TOTALSCORE: MEDICATION (SEE MAR)
TOTALSCORE: 0
TOTALSCORE: 0
CONSOLABILITY: NO NEED TO CONSOLE
BREATHING: NORMAL
BODYLANGUAGE: RELAXED
FACIALEXPRESSION: SMILING OR INEXPRESSIVE
BREATHING: NORMAL
TOTALSCORE: 0
FACIALEXPRESSION: SMILING OR INEXPRESSIVE
BODYLANGUAGE: RELAXED
BREATHING: NORMAL

## 2024-11-02 ASSESSMENT — PAIN SCALES - GENERAL
PAINLEVEL_OUTOF10: 10 - WORST POSSIBLE PAIN
PAINLEVEL_OUTOF10: 9
PAINLEVEL_OUTOF10: 0 - NO PAIN
PAINLEVEL_OUTOF10: 10 - WORST POSSIBLE PAIN
PAINLEVEL_OUTOF10: 10 - WORST POSSIBLE PAIN
PAINLEVEL_OUTOF10: 6
PAINLEVEL_OUTOF10: 9
PAINLEVEL_OUTOF10: 10 - WORST POSSIBLE PAIN

## 2024-11-02 ASSESSMENT — PAIN DESCRIPTION - LOCATION
LOCATION: HEAD

## 2024-11-02 ASSESSMENT — PAIN - FUNCTIONAL ASSESSMENT: PAIN_FUNCTIONAL_ASSESSMENT: 0-10

## 2024-11-02 ASSESSMENT — PAIN SCALES - WONG BAKER
WONGBAKER_NUMERICALRESPONSE: HURTS LITTLE BIT
WONGBAKER_NUMERICALRESPONSE: HURTS LITTLE MORE
WONGBAKER_NUMERICALRESPONSE: HURTS LITTLE MORE
WONGBAKER_NUMERICALRESPONSE: HURTS LITTLE BIT

## 2024-11-02 NOTE — PROGRESS NOTES
68 yo female fall with ICH.   Care discussed at the bedside with her .   Appears worse this AM.  Reports headache and decreased PO intake.   On exam, acute and chronically ill appearing. Abd is soft, NT, ND. BENSON. No focal neurologic deficit. Mucous membranes dry.   Plan for:  -ICH with new symptoms: CT head obtained showing:   The CT of the head again demonstrates areas of hyperdense  intraparenchymal hemorrhage centered within left temporal lobe  similar in appearance when compared with the prior study dated  10/30/2024. There is persistent surrounding edema and mass effect  contributing to asymmetric effacement of surrounding sulci and  partial effacement of the adjacent left lateral ventricle.      There is again evidence of similar extra-axial hyperdense hemorrhage  most pronounced surrounding the left temporal lobe which may be  subarachnoid and/or subdural in location. There is again evidence of  mildly prominent hyperdensity along the tentorium left greater than  right suggestive of subdural hemorrhage. There has been mild interval  enlargement of the extra-axial space low in attenuation similar to  CSF adjacent to the left frontal lobe when compared with 10/30/2024  raising the possibility of an additional small low-density subdural  collection versus mild interval prominence of the subarachnoid space.      There is residual but improved hyperdense subarachnoid hemorrhage  noted along sulci of the cerebral hemispheres when compared with the  prior study.      There has been interval improvement in the previously noted  hyperdense intraventricular hemorrhage layering within the lateral  ventricles when compared with the prior study.      The ventricular system remains nondilated without evidence of  hydrocephalus.    NSGY called to evaluated given concern for new small SDH. Lovenox held. Transfer to [facility] held. Continue Keppra  IVF bolus for dehydration and reevaluate.   Hgb 11.   SCDs

## 2024-11-02 NOTE — PROGRESS NOTES
[facility]  TRAUMA SERVICE - PROGRESS NOTE    Patient Name: Pia Nguyen  MRN: [id number]  Admit Date: 1028  : 1955  AGE: 69 y.o.   GENDER: female  ==============================================================================  MECHANISM OF INJURY:   Fall down 5 stairs  LOC (yes/no?): yes, (+) ETOH  Anticoagulant / Anti-platelet Rx? (for what dx?): DAPT for PVD  Referring Facility Name (N/A for scene EMR run): [facility]     INJURIES:   L frontal SAH  Thin L SDH  IPH near L silvian fissure  Pneumocephalus     OTHER MEDICAL PROBLEMS:  PVD  HTN  HLD  GERD  COPD/asthma  hypothyroidism     INCIDENTAL FINDINGS:  Bilateral carotid stenosis     PROCEDURES:  none    ==============================================================================  TODAY'S ASSESSMENT AND PLAN OF CARE:  # L frontal SAD, # thin L SDH, # IPH near L silvian fissure  -Neurosurgery rec: hold DAPT, SBP goal < 160   -Keppra x 7 days   -TBI precautions, HOB > 30, brain rest   -Q4 neurochecks   - will need 2-week follow up with [facility]  -PT/OT rec: high intensity (TBI)  -Fioricet Q4 PRN headaches/migraines  -Scheduled tylenol     #Ethmoid sinus fracture  -Plastics rec: no acute intervention, no antibiotics   - no scheduled follow up required    #syncopal workup  - EKG: NSR  - troponin negative  - orthostatics     # Comorbidities: hx depression, COPD/Asthma, HTN, HLD, PAD, hypothyroid, osteoporosis  #EtOH  - continue home Zoloft, PRN albuterol, carvedilol, Synthroid, PPI  - hold home Boniva  - hold home ASA/Plavix   - NSGY: ok for ASA post-bleed day 7 ()   - hold plavix until follow up  -thiamine/folate/b12  - Geriatrics consulted for multiple falls at home, rec:    - melatonin 3mg nightly   - cyanocobalamin 1000 mcg daily    - orthostatic vital signs  - PM&R consulted    #FEN/GI/  - Regular diet  - voiding spontaneously    #DVT PPX  -SCDs  - hold Lovenox; c/f new small SDH on CTH    Dispo: continue care on  service; [facility] TBI rehab when appropriate    Patient discussed with attending, Dr. Carbone.      Silvana Nixon PA-C  Trauma, Critical Care, and Acute Care Surgery  Floor: [id number] TSICU: [id number]    ==============================================================================  CHIEF COMPLAINT / OVERNIGHT EVENTS:   Patient was to be transferred to [facility] TBI rehab this morning. Patient endorsed increased severity of ongoing headache, with nausea and photophobia this morning. Stat CTH obtained given new symptoms, showed possibility of new SDH. Transfer held. Lovenox held. Neurosurgery called, recommended continued monitoring of headache for improvement with pain medication.     MEDICAL HISTORY / ROS:  Admission history and ROS reviewed. Pertinent changes as follows: none    PHYSICAL EXAM:  Heart Rate:  [58-96]   Temp:  [36.1 °C (97 °F)-37.1 °C (98.8 °F)]   Resp:  [17-18]   BP: (109-178)/(59-81)   SpO2:  [92 %-95 %]     Physical Exam  Constitutional:       General: She is not in acute distress.     Appearance: She is ill-appearing. She is not diaphoretic.   HENT:      Head: Normocephalic.      Right Ear: External ear normal.      Left Ear: External ear normal.      Nose: No rhinorrhea.      Mouth/Throat:      Mouth: Mucous membranes are dry.   Eyes:      Extraocular Movements: Extraocular movements intact.   Cardiovascular:      Pulses: Normal pulses.   Pulmonary:      Effort: Pulmonary effort is normal. No respiratory distress.      Breath sounds: No wheezing.   Abdominal:      General: There is no distension.      Palpations: Abdomen is soft.   Musculoskeletal:      Cervical back: No tenderness.      Comments: Moves all extremities spontaneously. SCDs in place to bilateral lower extremities.   Skin:     General: Skin is warm.      Comments: Age-related skin changes.   Neurological:      General: No focal deficit present.      Mental Status: She is alert and oriented to person, place, and time.      Sensory: No sensory  deficit.      Motor: No weakness.   Psychiatric:         Behavior: Behavior normal.         IMAGING SUMMARY:    - [facility] 11/2: concern for new small SDH    LABS:  Results from last 7 days   Lab Units 11/02/24  0749 11/01/24  0940 10/31/24  1204 10/28/24  2348 10/28/24  2234 10/28/24  2109   WBC AUTO x10*3/uL 6.5 6.6 7.0   < > 13.1* 9.6   HEMOGLOBIN g/dL 11.0* 10.8* 10.0*   < > 11.9* 12.6   HEMATOCRIT % 35.1* 34.1* 30.6*   < > 35.3* 39.1   PLATELETS AUTO x10*3/uL 179 195 179   < > 195 202   NEUTROS PCT AUTO %  --   --   --   --  76.5 47.4   LYMPHS PCT AUTO %  --   --   --   --  18.5 45.0   MONOS PCT AUTO %  --   --   --   --  2.8 4.8   EOS PCT AUTO %  --   --   --   --  0.7 1.3    < > = values in this interval not displayed.     Results from last 7 days   Lab Units 10/28/24  2348 10/28/24  2234 10/28/24  2109   APTT seconds 27  --   --    INR   --  0.9 1.0     Results from last 7 days   Lab Units 11/01/24  0940 10/31/24  1204 10/30/24  1450 10/28/24  2348 10/28/24  2234 10/28/24  2109   SODIUM mmol/L 139 135* 130*   < > 132* 133*   POTASSIUM mmol/L 3.7 3.3* 3.7   < > 3.4* 3.3*   CHLORIDE mmol/L 102 101 98   < > 99 102   CO2 mmol/L 27 23 24   < > 20* 20*   BUN mg/dL 12 11 8   < > 13 14   CREATININE mg/dL 0.62 0.57 0.44*   < > 0.65 0.79   CALCIUM mg/dL 8.8 8.4* 8.1*   < > 8.2* 8.8   PROTEIN TOTAL g/dL  --   --   --   --  7.1 7.1   BILIRUBIN TOTAL mg/dL  --   --   --   --  0.4 0.4   ALK PHOS U/L  --   --   --   --  56 60   ALT U/L  --   --   --   --  24 26   AST U/L  --   --   --   --  36 36   GLUCOSE mg/dL 108* 146* 129*   < > 163* 160*    < > = values in this interval not displayed.     Results from last 7 days   Lab Units 10/28/24  2234 10/28/24  2109   BILIRUBIN TOTAL mg/dL 0.4 0.4           I have reviewed all medications, laboratory results, and imaging pertinent for today's encounter.

## 2024-11-02 NOTE — PROGRESS NOTES
Patient is pending discharge today going to [facility] for TBI rehab. SW called patient , [place] [phone], and left a voicemail with details regarding the transfer.       HERIBERTO Hawk

## 2024-11-02 NOTE — PROGRESS NOTES
Pia Nguyen is a 69 y.o. female on day 5 of admission presenting with SAH (subarachnoid hemorrhage) (Multi).    Subjective   Patient had HA that prompted CT Head showing expected evolution of bleeds but possible LF hygroma. Headache resolved. No positional nature of HA, no s/s CSF leak    Objective     Physical Exam  A&Ox2  Face symmetric  RUE 5/5  LUE 5/5  RLE 5/5  LLE 5/5  Sensation intact to light touch throughout all extremities      Last Recorded Vitals  Blood pressure 113/68, pulse 52, temperature 36.3 °C (97.3 °F), temperature source Temporal, resp. rate 16, SpO2 97%.  Intake/Output last 3 Shifts:  I/O last 3 completed shifts:  In: 299 [P.O.:299]  Out: 300 [Urine:300]    Relevant Results      Assessment/Plan   Principal Problem:    SAH (subarachnoid hemorrhage) (Multi)    Pia is a 69 y.o. female with h/o HTN, HLD, PAD s/p L fem and popliteal artery angioplasty 7/2023, on ASA/PLX, asthma, COPD, hypothyroidism, depression, p/w fall down stairs, +HS. CTH 1.1x1.4 L temporal contusion, thin L frontal and tentorial SDH, L sylvian and basilar cistern SAH, bifrontal scattered pneumocephalus, CTH blossoming contusion, incr temporal and LF SDH, thin R tentorial SDH, L parietal contusion, CT face trace LF pneumocephalus w/ ethomoid lucencies, 10/30 CTH stable, 11/2 severe HA, CTH stable blood, small LF hygroma, resolve pneumocephalus    PLAN  Trauma primary  Q4 neurochecks  Please obtain CT IAC w/o contrast, there is no obvious CSF leak on exam  Arranged for 2 week follow up with repeat CT Head  Recommend keppra for seizure ppx x7d   Hold ASA restart post bleed day 7 and PLX restart at follow up  Hok for DVT ppx    Bhargav Gutierrez MD

## 2024-11-03 VITALS
RESPIRATION RATE: 16 BRPM | SYSTOLIC BLOOD PRESSURE: 164 MMHG | TEMPERATURE: 98.2 F | HEART RATE: 62 BPM | DIASTOLIC BLOOD PRESSURE: 91 MMHG | OXYGEN SATURATION: 95 %

## 2024-11-03 PROCEDURE — 2500000005 HC RX 250 GENERAL PHARMACY W/O HCPCS: Performed by: PHYSICIAN ASSISTANT

## 2024-11-03 PROCEDURE — 2500000002 HC RX 250 W HCPCS SELF ADMINISTERED DRUGS (ALT 637 FOR MEDICARE OP, ALT 636 FOR OP/ED): Performed by: NURSE PRACTITIONER

## 2024-11-03 PROCEDURE — 2500000001 HC RX 250 WO HCPCS SELF ADMINISTERED DRUGS (ALT 637 FOR MEDICARE OP): Performed by: NURSE PRACTITIONER

## 2024-11-03 PROCEDURE — 99231 SBSQ HOSP IP/OBS SF/LOW 25: CPT | Performed by: STUDENT IN AN ORGANIZED HEALTH CARE EDUCATION/TRAINING PROGRAM

## 2024-11-03 PROCEDURE — 2500000004 HC RX 250 GENERAL PHARMACY W/ HCPCS (ALT 636 FOR OP/ED): Performed by: STUDENT IN AN ORGANIZED HEALTH CARE EDUCATION/TRAINING PROGRAM

## 2024-11-03 PROCEDURE — 2500000004 HC RX 250 GENERAL PHARMACY W/ HCPCS (ALT 636 FOR OP/ED): Performed by: NURSE PRACTITIONER

## 2024-11-03 PROCEDURE — 2500000001 HC RX 250 WO HCPCS SELF ADMINISTERED DRUGS (ALT 637 FOR MEDICARE OP): Performed by: PHYSICIAN ASSISTANT

## 2024-11-03 PROCEDURE — 1100000001 HC PRIVATE ROOM DAILY

## 2024-11-03 RX ORDER — BUTALBITAL, ACETAMINOPHEN AND CAFFEINE 50; 325; 40 MG/1; MG/1; MG/1
1 TABLET ORAL EVERY 6 HOURS PRN
Status: DISCONTINUED | OUTPATIENT
Start: 2024-11-03 | End: 2024-11-04 | Stop reason: HOSPADM

## 2024-11-03 RX ADMIN — LEVOTHYROXINE SODIUM 100 MCG: 0.1 TABLET ORAL at 06:41

## 2024-11-03 RX ADMIN — CARVEDILOL 12.5 MG: 12.5 TABLET, FILM COATED ORAL at 20:19

## 2024-11-03 RX ADMIN — Medication 0.4 MG: at 08:18

## 2024-11-03 RX ADMIN — ROSUVASTATIN CALCIUM 40 MG: 20 TABLET, FILM COATED ORAL at 20:19

## 2024-11-03 RX ADMIN — OXYCODONE HYDROCHLORIDE 5 MG: 5 TABLET ORAL at 01:29

## 2024-11-03 RX ADMIN — SERTRALINE 75 MG: 50 TABLET, FILM COATED ORAL at 08:18

## 2024-11-03 RX ADMIN — ACETAMINOPHEN 650 MG: 325 TABLET ORAL at 01:30

## 2024-11-03 RX ADMIN — ACETAMINOPHEN 650 MG: 325 TABLET ORAL at 10:30

## 2024-11-03 RX ADMIN — ACETAMINOPHEN 650 MG: 325 TABLET ORAL at 16:42

## 2024-11-03 RX ADMIN — CARVEDILOL 12.5 MG: 12.5 TABLET, FILM COATED ORAL at 08:18

## 2024-11-03 RX ADMIN — THIAMINE HCL TAB 100 MG 100 MG: 100 TAB at 08:18

## 2024-11-03 RX ADMIN — CYANOCOBALAMIN TAB 1000 MCG 1000 MCG: 1000 TAB at 08:18

## 2024-11-03 RX ADMIN — LEVETIRACETAM 500 MG: 5 INJECTION INTRAVENOUS at 20:21

## 2024-11-03 RX ADMIN — Medication 3 MG: at 20:19

## 2024-11-03 RX ADMIN — LEVETIRACETAM 500 MG: 5 INJECTION INTRAVENOUS at 10:36

## 2024-11-03 RX ADMIN — ENOXAPARIN SODIUM 30 MG: 30 INJECTION SUBCUTANEOUS at 20:28

## 2024-11-03 RX ADMIN — PANTOPRAZOLE SODIUM 40 MG: 40 TABLET, DELAYED RELEASE ORAL at 06:41

## 2024-11-03 ASSESSMENT — COGNITIVE AND FUNCTIONAL STATUS - GENERAL
TURNING FROM BACK TO SIDE WHILE IN FLAT BAD: A LITTLE
TOILETING: A LITTLE
MOVING TO AND FROM BED TO CHAIR: A LITTLE
HELP NEEDED FOR BATHING: A LITTLE
PERSONAL GROOMING: A LITTLE
MOBILITY SCORE: 18
CLIMB 3 TO 5 STEPS WITH RAILING: A LITTLE
DAILY ACTIVITIY SCORE: 18
DRESSING REGULAR UPPER BODY CLOTHING: A LITTLE
MOVING FROM LYING ON BACK TO SITTING ON SIDE OF FLAT BED WITH BEDRAILS: A LITTLE
TURNING FROM BACK TO SIDE WHILE IN FLAT BAD: A LITTLE
DRESSING REGULAR LOWER BODY CLOTHING: A LITTLE
MOBILITY SCORE: 18
WALKING IN HOSPITAL ROOM: A LITTLE
WALKING IN HOSPITAL ROOM: A LITTLE
MOVING TO AND FROM BED TO CHAIR: A LITTLE
STANDING UP FROM CHAIR USING ARMS: A LITTLE
EATING MEALS: A LITTLE
CLIMB 3 TO 5 STEPS WITH RAILING: A LITTLE
MOVING FROM LYING ON BACK TO SITTING ON SIDE OF FLAT BED WITH BEDRAILS: A LITTLE
STANDING UP FROM CHAIR USING ARMS: A LITTLE

## 2024-11-03 ASSESSMENT — PAIN SCALES - GENERAL
PAINLEVEL_OUTOF10: 3
PAINLEVEL_OUTOF10: 9
PAINLEVEL_OUTOF10: 4
PAINLEVEL_OUTOF10: 9

## 2024-11-03 ASSESSMENT — PAIN DESCRIPTION - ORIENTATION: ORIENTATION: RIGHT;POSTERIOR

## 2024-11-03 ASSESSMENT — PAIN - FUNCTIONAL ASSESSMENT: PAIN_FUNCTIONAL_ASSESSMENT: 0-10

## 2024-11-03 NOTE — PROGRESS NOTES
Pia Nguyen is a 69 y.o. female on day 6 of admission presenting with SAH (subarachnoid hemorrhage) (Multi).    Subjective   Doing well. NO events overnight. No headache    Objective     Physical Exam  A&Ox3  Face symmetric  RUE 5/5  LUE 5/5  RLE 5/5  LLE 5/5  Sensation intact to light touch throughout all extremities  No rhinorrhea    Last Recorded Vitals  Blood pressure 150/79, pulse 57, temperature 36.7 °C (98.1 °F), temperature source Temporal, resp. rate 16, SpO2 99%.  Intake/Output last 3 Shifts:  I/O last 3 completed shifts:  In: 299 [P.O.:299]  Out: 300 [Urine:300]    Relevant Results  Lab Results   Component Value Date    WBC 6.5 11/02/2024    HGB 11.0 (L) 11/02/2024    HCT 35.1 (L) 11/02/2024    MCV 87 11/02/2024     11/02/2024     Lab Results   Component Value Date    GLUCOSE 108 (H) 11/01/2024    CALCIUM 8.8 11/01/2024     11/01/2024    K 3.7 11/01/2024    CO2 27 11/01/2024     11/01/2024    BUN 12 11/01/2024    CREATININE 0.62 11/01/2024     Assessment/Plan   Principal Problem:    SAH (subarachnoid hemorrhage) (Multi)    Pia is a 69 y.o. female with h/o HTN, HLD, PAD s/p L fem and popliteal artery angioplasty 7/2023, on ASA/PLX, asthma, COPD, hypothyroidism, depression, p/w fall down stairs, +HS. CTH 1.1x1.4 L temporal contusion, thin L frontal and tentorial SDH, L sylvian and basilar cistern SAH, bifrontal scattered pneumocephalus, CTH blossoming contusion, incr temporal and LF SDH, thin R tentorial SDH, L parietal contusion, CT face trace LF pneumocephalus w/ ethomoid lucencies, 10/30 CTH stable, 11/2 severe HA, CTH stable blood, small LF hygroma, resolve pneumocephalus  11/2 severe HA, CTH with stable blood, small LF hygroma, resolved pneumocephalus, CT IAC neg    Patient with no clinical signs of CSF leak. CT face trace LF pneumocephalus w/ ethomoid lucencies. Natural history of fx is resolution. Recommend keppra for seizure ppx x7d. Hold ASA restart post bleed day 7 and PLX  restart at follow up. Will arrange outpatient follow up with CT head. Ok for discharge from neurosurgical perspective. Please place in patient's discharge profile when it populates in visit history. Thank you for allowing us to participate in the care of this patient. Will sign off at this time. Please page [phone] with any questions or concerns.     Anant Hoffmann MD

## 2024-11-03 NOTE — PROGRESS NOTES
[facility]  TRAUMA SERVICE - PROGRESS NOTE    Patient Name: Pia Nguyen  MRN: [id number]  Admit Date: 1028  : 1955  AGE: 69 y.o.   GENDER: female  ==============================================================================  MECHANISM OF INJURY:   Fall down 5 stairs  LOC (yes/no?): yes, (+) ETOH  Anticoagulant / Anti-platelet Rx? (for what dx?): DAPT for PVD  Referring Facility Name (N/A for scene EMR run): [facility]     INJURIES:   L frontal SAH  Thin L SDH  IPH near L silvian fissure  Pneumocephalus     OTHER MEDICAL PROBLEMS:  PVD  HTN  HLD  GERD  COPD/asthma  hypothyroidism     INCIDENTAL FINDINGS:  Bilateral carotid stenosis     PROCEDURES:  none    ==============================================================================  TODAY'S ASSESSMENT AND PLAN OF CARE:  # L frontal SAD, # thin L SDH, # IPH near L silvian fissure  -Neurosurgery rec: hold DAPT, SBP goal < 160   -Keppra x 7 days   -TBI precautions, HOB > 30, brain rest   -Q4 neurochecks   - will need 2-week follow up with [facility]  -PT/OT rec: high intensity (TBI)  -Fioricet Q4 PRN headaches/migraines  -Scheduled tylenol     #Ethmoid sinus fracture  -Plastics rec: no acute intervention, no antibiotics   - no scheduled follow up required    #syncopal workup  - EKG: NSR  - troponin negative  - orthostatics     # Comorbidities: hx depression, COPD/Asthma, HTN, HLD, PAD, hypothyroid, osteoporosis  #EtOH  - continue home Zoloft, PRN albuterol, carvedilol, Synthroid, PPI  - hold home Boniva  - hold home ASA/Plavix   - NSGY: ok for ASA post-bleed day 7 ()   - hold plavix until follow up  -thiamine/folate/b12  - Geriatrics consulted for multiple falls at home, rec:    - melatonin 3mg nightly   - cyanocobalamin 1000 mcg daily    - orthostatic vital signs  - PM&R consulted     #FEN/GI/  - Regular diet  - voiding spontaneously    #DVT PPX  -SCDs  - re-started lovenox    Dispo: continue care on service; [facility]    Pt discussed with attending Dr. Carbone    Total face to face time spent with patient/family of 20 minutes, with >50% of the time spent discussing plan of care/management, counseling/educating on disease processes, explaining results of diagnostic testing.     Kasie Dickson PA-C  Trauma, Critical Care, Acute Care Surgery   Floor: [id number]  TSICU: [id number]     ==============================================================================  CHIEF COMPLAINT / OVERNIGHT EVENTS:   Overnight pt had follow up CT done, NSGY stated ok for discharge from their perspective, no signs of CSF leak. Pt this morning conversive but confused. Eating breakfast during rounds this morning    MEDICAL HISTORY / ROS:  Admission history and ROS reviewed. Pertinent changes as follows: none    PHYSICAL EXAM:  Heart Rate:  [57-74]   Temp:  [36.2 °C (97.2 °F)-37 °C (98.6 °F)]   Resp:  [16-18]   BP: (135-172)/(68-79)   SpO2:  [95 %-99 %]     Physical Exam  Constitutional:       General: She is not in acute distress.     Appearance: She is not diaphoretic.   HENT:      Head: Normocephalic.      Right Ear: External ear normal.      Left Ear: External ear normal.   Eyes:      Extraocular Movements: Extraocular movements intact.      Conjunctiva/sclera: Conjunctivae normal.   Cardiovascular:      Rate and Rhythm: Normal rate.      Pulses: Normal pulses.   Pulmonary:      Effort: Pulmonary effort is normal. No respiratory distress.      Breath sounds: No wheezing.   Abdominal:      General: There is no distension.      Palpations: Abdomen is soft.      Tenderness: There is no abdominal tenderness.   Musculoskeletal:      Cervical back: No tenderness.      Comments: Moves all extremities spontaneously. SCDs in place to bilateral lower extremities.  strength and dorsi/plantarflexion bilaterally 5/5    Skin:     General: Skin is warm.      Comments: Age-related skin changes.   Neurological:      General: No focal deficit  present.      Mental Status: She is alert and oriented to person, place, and time.      Sensory: No sensory deficit.      Motor: No weakness.      Comments: GCS 15   Psychiatric:         Mood and Affect: Mood normal.         Behavior: Behavior normal.         IMAGING SUMMARY:      LABS:  Results from last 7 days   Lab Units 11/02/24  0749 11/01/24  0940 10/31/24  1204 10/28/24  2348 10/28/24  2234 10/28/24  2109   WBC AUTO x10*3/uL 6.5 6.6 7.0   < > 13.1* 9.6   HEMOGLOBIN g/dL 11.0* 10.8* 10.0*   < > 11.9* 12.6   HEMATOCRIT % 35.1* 34.1* 30.6*   < > 35.3* 39.1   PLATELETS AUTO x10*3/uL 179 195 179   < > 195 202   NEUTROS PCT AUTO %  --   --   --   --  76.5 47.4   LYMPHS PCT AUTO %  --   --   --   --  18.5 45.0   MONOS PCT AUTO %  --   --   --   --  2.8 4.8   EOS PCT AUTO %  --   --   --   --  0.7 1.3    < > = values in this interval not displayed.     Results from last 7 days   Lab Units 10/28/24  2348 10/28/24  2234 10/28/24  2109   APTT seconds 27  --   --    INR   --  0.9 1.0     Results from last 7 days   Lab Units 11/01/24  0940 10/31/24  1204 10/30/24  1450 10/28/24  2348 10/28/24  2234 10/28/24  2109   SODIUM mmol/L 139 135* 130*   < > 132* 133*   POTASSIUM mmol/L 3.7 3.3* 3.7   < > 3.4* 3.3*   CHLORIDE mmol/L 102 101 98   < > 99 102   CO2 mmol/L 27 23 24   < > 20* 20*   BUN mg/dL 12 11 8   < > 13 14   CREATININE mg/dL 0.62 0.57 0.44*   < > 0.65 0.79   CALCIUM mg/dL 8.8 8.4* 8.1*   < > 8.2* 8.8   PROTEIN TOTAL g/dL  --   --   --   --  7.1 7.1   BILIRUBIN TOTAL mg/dL  --   --   --   --  0.4 0.4   ALK PHOS U/L  --   --   --   --  56 60   ALT U/L  --   --   --   --  24 26   AST U/L  --   --   --   --  36 36   GLUCOSE mg/dL 108* 146* 129*   < > 163* 160*    < > = values in this interval not displayed.     Results from last 7 days   Lab Units 10/28/24  2747 10/28/24  2109   BILIRUBIN TOTAL mg/dL 0.4 0.4           I have reviewed all medications, laboratory results, and imaging pertinent for today's encounter.

## 2024-11-03 NOTE — CARE PLAN
The patient's goals for the shift include      The clinical goals for the shift include pt will remain HDS    Problem: Skin  Goal: Decreased wound size/increased tissue granulation at next dressing change  Outcome: Progressing     Problem: Skin  Goal: Participates in plan/prevention/treatment measures  Outcome: Progressing     Problem: Skin  Goal: Prevent/manage excess moisture  Outcome: Progressing     Problem: Skin  Goal: Promote/optimize nutrition  Outcome: Progressing     Problem: Pain - Adult  Goal: Verbalizes/displays adequate comfort level or baseline comfort level  Outcome: Progressing     Problem: Safety - Adult  Goal: Free from fall injury  Outcome: Progressing     Problem: Fall/Injury  Goal: Not fall by end of shift  Outcome: Progressing     Problem: Fall/Injury  Goal: Verbalize understanding of risk factor reduction measures to prevent injury from fall in the home  Outcome: Progressing     Problem: Pain  Goal: Takes deep breaths with improved pain control throughout the shift  Outcome: Progressing

## 2024-11-04 VITALS
OXYGEN SATURATION: 99 % | HEART RATE: 65 BPM | TEMPERATURE: 97.7 F | RESPIRATION RATE: 15 BRPM | DIASTOLIC BLOOD PRESSURE: 74 MMHG | SYSTOLIC BLOOD PRESSURE: 112 MMHG

## 2024-11-04 PROCEDURE — 2500000001 HC RX 250 WO HCPCS SELF ADMINISTERED DRUGS (ALT 637 FOR MEDICARE OP): Performed by: NURSE PRACTITIONER

## 2024-11-04 PROCEDURE — 2500000002 HC RX 250 W HCPCS SELF ADMINISTERED DRUGS (ALT 637 FOR MEDICARE OP, ALT 636 FOR OP/ED): Performed by: NURSE PRACTITIONER

## 2024-11-04 PROCEDURE — 99239 HOSP IP/OBS DSCHRG MGMT >30: CPT

## 2024-11-04 PROCEDURE — 97530 THERAPEUTIC ACTIVITIES: CPT | Mod: GP

## 2024-11-04 PROCEDURE — 99232 SBSQ HOSP IP/OBS MODERATE 35: CPT | Performed by: NURSE PRACTITIONER

## 2024-11-04 PROCEDURE — 2500000004 HC RX 250 GENERAL PHARMACY W/ HCPCS (ALT 636 FOR OP/ED): Performed by: STUDENT IN AN ORGANIZED HEALTH CARE EDUCATION/TRAINING PROGRAM

## 2024-11-04 PROCEDURE — 2500000004 HC RX 250 GENERAL PHARMACY W/ HCPCS (ALT 636 FOR OP/ED): Performed by: NURSE PRACTITIONER

## 2024-11-04 PROCEDURE — 2500000001 HC RX 250 WO HCPCS SELF ADMINISTERED DRUGS (ALT 637 FOR MEDICARE OP)

## 2024-11-04 PROCEDURE — 2500000001 HC RX 250 WO HCPCS SELF ADMINISTERED DRUGS (ALT 637 FOR MEDICARE OP): Performed by: PHYSICIAN ASSISTANT

## 2024-11-04 RX ORDER — LANOLIN ALCOHOL/MO/W.PET/CERES
1000 CREAM (GRAM) TOPICAL DAILY
Start: 2024-11-05

## 2024-11-04 RX ORDER — ASPIRIN 81 MG/1
81 TABLET ORAL
Status: DISCONTINUED | OUTPATIENT
Start: 2024-11-04 | End: 2024-11-04 | Stop reason: HOSPADM

## 2024-11-04 RX ORDER — LANOLIN ALCOHOL/MO/W.PET/CERES
100 CREAM (GRAM) TOPICAL DAILY
Start: 2024-11-05

## 2024-11-04 RX ORDER — FOLIC ACID 0.4 MG
0.4 TABLET ORAL DAILY
Start: 2024-11-05

## 2024-11-04 RX ORDER — BUTALBITAL, ACETAMINOPHEN AND CAFFEINE 50; 325; 40 MG/1; MG/1; MG/1
1 TABLET ORAL EVERY 6 HOURS PRN
Start: 2024-11-04

## 2024-11-04 RX ORDER — CARVEDILOL 12.5 MG/1
12.5 TABLET ORAL 2 TIMES DAILY
Status: CANCELLED
Start: 2024-11-04 | End: 2024-12-04

## 2024-11-04 RX ORDER — TALC
3 POWDER (GRAM) TOPICAL NIGHTLY
Start: 2024-11-04

## 2024-11-04 RX ORDER — OXYCODONE HYDROCHLORIDE 5 MG/1
5 TABLET ORAL EVERY 6 HOURS PRN
Start: 2024-11-04

## 2024-11-04 RX ORDER — ACETAMINOPHEN 325 MG/1
650 TABLET ORAL
Start: 2024-11-04

## 2024-11-04 RX ADMIN — Medication 0.4 MG: at 09:05

## 2024-11-04 RX ADMIN — ACETAMINOPHEN 650 MG: 325 TABLET ORAL at 09:05

## 2024-11-04 RX ADMIN — ENOXAPARIN SODIUM 30 MG: 30 INJECTION SUBCUTANEOUS at 09:04

## 2024-11-04 RX ADMIN — CYANOCOBALAMIN TAB 1000 MCG 1000 MCG: 1000 TAB at 09:05

## 2024-11-04 RX ADMIN — ASPIRIN 81 MG: 81 TABLET, COATED ORAL at 09:11

## 2024-11-04 RX ADMIN — PANTOPRAZOLE SODIUM 40 MG: 40 TABLET, DELAYED RELEASE ORAL at 09:05

## 2024-11-04 RX ADMIN — LEVETIRACETAM 500 MG: 5 INJECTION INTRAVENOUS at 10:31

## 2024-11-04 RX ADMIN — SERTRALINE 75 MG: 50 TABLET, FILM COATED ORAL at 09:05

## 2024-11-04 RX ADMIN — THIAMINE HCL TAB 100 MG 100 MG: 100 TAB at 09:05

## 2024-11-04 RX ADMIN — LEVOTHYROXINE SODIUM 100 MCG: 0.1 TABLET ORAL at 06:32

## 2024-11-04 RX ADMIN — OXYCODONE HYDROCHLORIDE 5 MG: 5 TABLET ORAL at 03:51

## 2024-11-04 RX ADMIN — CARVEDILOL 12.5 MG: 12.5 TABLET, FILM COATED ORAL at 09:05

## 2024-11-04 ASSESSMENT — COGNITIVE AND FUNCTIONAL STATUS - GENERAL
TURNING FROM BACK TO SIDE WHILE IN FLAT BAD: A LITTLE
WALKING IN HOSPITAL ROOM: A LITTLE
MOVING TO AND FROM BED TO CHAIR: A LITTLE
MOBILITY SCORE: 17
STANDING UP FROM CHAIR USING ARMS: A LITTLE
MOVING FROM LYING ON BACK TO SITTING ON SIDE OF FLAT BED WITH BEDRAILS: A LITTLE
CLIMB 3 TO 5 STEPS WITH RAILING: A LOT

## 2024-11-04 ASSESSMENT — PAIN - FUNCTIONAL ASSESSMENT: PAIN_FUNCTIONAL_ASSESSMENT: 0-10

## 2024-11-04 ASSESSMENT — PAIN SCALES - GENERAL
PAINLEVEL_OUTOF10: 7
PAINLEVEL_OUTOF10: 7

## 2024-11-04 ASSESSMENT — PAIN DESCRIPTION - ORIENTATION: ORIENTATION: MID

## 2024-11-04 NOTE — PROGRESS NOTES
Physical Therapy    Physical Therapy Treatment    Patient Name: Pia Nguyen  MRN: [id number]  Department: [place] [id number]  Room: [address] [place] [address]  Today's Date: 11/4/2024  Time Calculation  Start Time: 1143  Stop Time: 1200  Time Calculation (min): 17 min         Assessment/Plan   PT Assessment  Barriers to Discharge: medical needs  End of Session Communication: Bedside nurse  Assessment Comment: Pt tolerated session with headache rated 7/10.  Able to amb increased distance this session ~60ft with RW and close supervision.  Pt completed LE ther ex with no difficulty.  Pt continues to benefit from skilled PT and remains appropriate for high intensity PT upon discharge.  End of Session Patient Position: Bed, 3 rail up, Alarm on (family member leaving room)  PT Plan  Inpatient/Swing Bed or Outpatient: Inpatient  PT Plan  Treatment/Interventions: Bed mobility, Transfer training, Gait training, Stair training, Balance training, Neuromuscular re-education, Strengthening, Endurance training, Range of motion, Therapeutic exercise, Therapeutic activity, Home exercise program  PT Plan: Ongoing PT  PT Frequency: 4 times per week  PT Discharge Recommendations: High intensity level of continued care (TBI)  Equipment Recommended upon Discharge:  (TBD at next level of care)  PT Recommended Transfer Status: Assistive device, Stand by assist (RW)  PT - OK to Discharge: Yes (eval complete and discharge recommendations made)      General Visit Information:   PT  Visit  PT Received On: 11/04/24  General  Reason for Referral: p/w fall down stairs, +HS. CTH 1.1x1.4 L temporal contusion, thin L frontal and tentorial SDH, L sylvian and basilar cistern SAH, bifrontal scattered pneumocephalus, CTH blossoming contusion, incr temporal and LF SDH, thin R tentorial SDH, L parietal contusion  Past Medical History Relevant to Rehab: 69 y.o. female with h/o HTN, HLD, PAD s/p L fem and popliteal artery angioplasty 7/2023, on ASA/PLX, asthma, COPD,  "hypothyroidism, depression  Family/Caregiver Present: Yes (supportive family member in room, helpful with encouraging pt to participate in PT)  Prior to Session Communication: Bedside nurse  Patient Position Received: Bed, 2 rail up, Alarm off, not on at start of session (sitting EOB with family member, needing to go to bathroom)  General Comment: Pt cleared for PT by RN. Pt alert and agreeable to PT. +PIVx2    Subjective   Precautions:  Precautions  Medical Precautions: Fall precautions, Spinal precautions  Precautions Comment: Sinus precautions, HOB >30, SBP <160    Objective   Pain:  Pain Assessment  Pain Assessment: 0-10  0-10 (Numeric) Pain Score: 7  Pain Type: Acute pain  Pain Location: Head (headache)  Pain Interventions: Ambulation/increased activity, Repositioned, Rest  Response to Interventions: no change in pain  Coordination:  Movements are Fluid and Coordinated: Yes  Postural Control:  Postural Control  Postural Control: Within Functional Limits  Static Sitting Balance  Static Sitting-Balance Support: Bilateral upper extremity supported, Feet supported  Static Sitting-Level of Assistance: Close supervision  Dynamic Sitting Balance  Dynamic Sitting-Balance Support: Bilateral upper extremity supported, Feet supported  Dynamic Sitting-Level of Assistance: Close supervision  Dynamic Sitting-Balance: Forward lean  Static Standing Balance  Static Standing-Balance Support: Bilateral upper extremity supported (RW)  Static Standing-Level of Assistance: Close supervision  Dynamic Standing Balance  Dynamic Standing-Balance Support: Bilateral upper extremity supported (RW)  Dynamic Standing-Level of Assistance: Close supervision  Dynamic Standing-Balance: Turning  Activity Tolerance:  Activity Tolerance  Endurance: Tolerates 10 - 20 min exercise with multiple rests  Treatments:  Therapeutic Exercise  Therapeutic Exercise Performed: Yes  Therapeutic Exercise Activity 1: ankle pumps, heel slides, quad sets x3\" hold, " hip abd/add x10 each LE    Therapeutic Activity  Therapeutic Activity Performed: Yes  Therapeutic Activity 1: bed mobility, transfers, pt/family edu on spine precautions, importance of mobility, and HEP.  short distance amb    Bed Mobility  Bed Mobility: Yes  Bed Mobility 1  Bed Mobility 1: Supine to sitting, Sitting to supine  Level of Assistance 1: Close supervision  Bed Mobility Comments 1: HOB elevated, edu on spine precautions  Bed Mobility 2  Bed Mobility  2: Scooting (to HOB in supine)  Level of Assistance 2: Dependent  Bed Mobility Comments 2: HOB immediately returned to 30° after boosting    Ambulation/Gait Training  Ambulation/Gait Training Performed: Yes  Ambulation/Gait Training 1  Surface 1: Level tile  Device 1: Rolling walker  Assistance 1: Close supervision  Quality of Gait 1: Decreased step length, Inconsistent stride length, Diminished heel strike, Forward flexed posture  Comments/Distance (ft) 1: 60ft limited by fatigue  Transfers  Transfer: Yes  Transfer 1  Transfer From 1: Bed to  Transfer to 1: Stand  Technique 1: Sit to stand, Stand to sit  Transfer Device 1: Walker  Transfer Level of Assistance 1: Close supervision  Trials/Comments 1: VCs for hand placement  Transfers 2  Transfer From 2: Toilet to  Transfer to 2: Stand  Technique 2: Stand to sit, Sit to stand  Transfer Device 2: Walker  Transfer Level of Assistance 2: Close supervision    Outcome Measures:  [facility] Basic Mobility  Turning from your back to your side while in a flat bed without using bedrails: A little  Moving from lying on your back to sitting on the side of a flat bed without using bedrails: A little  Moving to and from bed to chair (including a wheelchair): A little  Standing up from a chair using your arms (e.g. wheelchair or bedside chair): A little  To walk in hospital room: A little  Climbing 3-5 steps with railing: A lot  Basic Mobility - Total Score: 17    Education Documentation  Precautions, taught by Rocio Dubon,  PT at 11/4/2024 12:14 PM.  Learner: Patient  Readiness: Acceptance  Method: Explanation  Response: Needs Reinforcement  Comment: spine precautions, HOB 30°    Body Mechanics, taught by Rocio Dubon, PT at 11/4/2024 12:14 PM.  Learner: Patient  Readiness: Acceptance  Method: Explanation  Response: Needs Reinforcement  Comment: spine precautions, HOB 30°    Home Exercise Program, taught by Rocio Dubon, PT at 11/4/2024 12:14 PM.  Learner: Patient  Readiness: Acceptance  Method: Explanation  Response: Needs Reinforcement  Comment: spine precautions, HOB 30°    Mobility Training, taught by Rocio Dubon, PT at 11/4/2024 12:14 PM.  Learner: Patient  Readiness: Acceptance  Method: Explanation  Response: Needs Reinforcement  Comment: spine precautions, HOB 30°    Education Comments  No comments found.        Encounter Problems       Encounter Problems (Active)       Balance       STG - Maintains dynamic standing balance with upper extremity support on LRAD with Sup using LRAD (Progressing)       Start:  10/29/24    Expected End:  11/19/24            STG - Maintains dynamic sitting balance without upper extremity support with Sup (Progressing)       Start:  10/29/24    Expected End:  11/19/24               Mobility       STG - Patient will ambulate x100 ft with Sup using LRAD (Progressing)       Start:  10/29/24    Expected End:  11/19/24            STG - Patient will ascend and descend 3 stairs with rail vs LRAD with CGA (Progressing)       Start:  10/29/24    Expected End:  11/19/24               PT Transfers       STG - Patient will perform bed mobility with Sup (Progressing)       Start:  10/29/24    Expected End:  11/19/24            STG - Patient will transfer sit to and from stand with Sup using LRAD (Progressing)       Start:  10/29/24    Expected End:  11/19/24               Pain - Adult

## 2024-11-04 NOTE — PROGRESS NOTES
Subjective   Seen today for follow up visit. Over the weekend patient complained of headache as well as nausea. Patient had repeat CTH on 11/2, which was stable. Patient reports she is feeling a bit better today, headache is better, no nausea or abdominal discomfort. No pain. Her  is at bedside and has no concerns. He states plan is for transfer to [facility] rehab today.        Objective     Current Facility-Administered Medications   Medication Dose Route Frequency Provider Last Rate Last Admin    acetaminophen (Tylenol) tablet 650 mg  650 mg oral TID Serena Ureña PA-C   650 mg at 11/04/24 0905    albuterol 90 mcg/actuation inhaler 2 puff  2 puff inhalation q4h PRN CANDIDA Alvarez        aspirin EC tablet 81 mg  81 mg oral Daily Silvana Nixon PA-C        butalbital-acetaminophen-caff -40 mg per tablet 1 tablet  1 tablet oral q6h PRN Kasie Dickson PA-C        carvedilol (Coreg) tablet 12.5 mg  12.5 mg oral BID CANDIDA Alvarez   12.5 mg at 11/04/24 0905    cyanocobalamin (Vitamin B-12) tablet 1,000 mcg  1,000 mcg oral Daily Serena Ureña PA-C   1,000 mcg at 11/04/24 0905    enoxaparin (Lovenox) syringe 30 mg  30 mg subcutaneous BID Kasie Dickson PA-C   30 mg at 11/04/24 0904    folic acid (Folvite) tablet 0.4 mg  0.4 mg oral Daily CANDIDA Alvarez   0.4 mg at 11/04/24 0905    levETIRAcetam (Keppra) 500 mg in sodium chloride (iso)  mL  500 mg intravenous q12h CANDIDA Alvarze   Stopped at 11/03/24 2036    levothyroxine (Synthroid, Levoxyl) tablet 100 mcg  100 mcg oral Daily CANDIDA Alvarez   100 mcg at 11/04/24 0632    melatonin tablet 3 mg  3 mg oral Nightly Serena Ureña PA-C   3 mg at 11/03/24 2019    ondansetron (Zofran) injection 4 mg  4 mg intravenous q6h PRN REX Alvarez-CNP   4 mg at 10/30/24 1055    oxyCODONE (Roxicodone) immediate release tablet 5 mg  5 mg oral q6h PRN Serena Ureña PA-C   5 mg at 11/04/24 0351    pantoprazole  (ProtoNix) EC tablet 40 mg  40 mg oral Daily before breakfast SUZANNA AlvarezCNP   40 mg at 11/04/24 0905    rosuvastatin (Crestor) tablet 40 mg  40 mg oral Nightly SUZANNA AvlarezCNP   40 mg at 11/03/24 2019    sertraline (Zoloft) tablet 75 mg  75 mg oral Daily REX Alvarez-CNP   75 mg at 11/04/24 0905    thiamine (Vitamin B-1) tablet 100 mg  100 mg oral Daily REX Alvarez-CNP   100 mg at 11/04/24 0905       Physical Exam  HENT:      Nose: Nose normal.      Mouth/Throat:      Mouth: Mucous membranes are moist.   Cardiovascular:      Rate and Rhythm: Normal rate and regular rhythm.   Pulmonary:      Effort: Pulmonary effort is normal.      Breath sounds: Normal breath sounds.   Abdominal:      Palpations: Abdomen is soft.      Tenderness: There is no abdominal tenderness.   Neurological:      Mental Status: She is alert.      Comments: Oriented to person, place and year. She is not oriented to day, date, month. She is moving all extremities equally.    Psychiatric:         Mood and Affect: Mood normal.         Confusion Assessment Method(CAM) for diagnosis of delirium:    1.  Acute onset or fluctuating course: absent  2.  Inattention: absent  3.  Disorganized thinking: absent  4.  Altered level of consciousness: absent  CAM: negative    AT Score For Assessment of Delirium and Cognitive Impairment:    Alertness: 0  Normal(fully alert,but not agitated, throughout assessment)=0  Mild sleepiness for <10 seconds after walking, then normal=0  Clearly abnormal=4  2.  AMT4: 2  No mistakes=0  One mistake=1  Two or more mistakes/untestable=2  3.  Attention: 0  Achieves seven months or more correctly=0  Starts but scores <7 months/ refuses to start=1  Untestable(cannot start because unwell, drowsy, inattentive)=2  4.  Acute: 0  No=0  Yes=4    Total Score: 2  4 or above: Possible delirium +/- cognitive impairment  1-3: Possible cognitive impairment  0: Delirium or severe cognitive impairment  unlikely(but delirium still possible if (4) information incomplete)      Last Recorded Vitals      11/3/2024     3:34 AM 11/3/2024     8:15 AM 11/3/2024     1:16 PM 11/3/2024     8:10 PM 11/4/2024    12:37 AM 11/4/2024     3:17 AM 11/4/2024     9:03 AM   Vitals   Systolic 153 135 137 164 166 171 174   Diastolic 68 74 76 91 83 78 84   Heart Rate 62 74 58 62 61 67 62   Temp 36.8 °C (98.2 °F) 37 °C (98.6 °F) 36.2 °C (97.2 °F) 36.8 °C (98.2 °F) 36.7 °C (98 °F) 37.2 °C (98.9 °F)    Resp 16 18 18 16 16 16 12      There were no vitals filed for this visit.     Relevant Results  Lab Results   Component Value Date    TSH 0.12 (L) 06/10/2020     Results from last 7 days   Lab Units 11/02/24  0749 11/01/24  0940 10/31/24  1204 10/30/24  1450 10/28/24  2348 10/28/24  2234 10/28/24  2109   WBC AUTO x10*3/uL 6.5 6.6 7.0  --    < > 13.1* 9.6   HEMOGLOBIN g/dL 11.0* 10.8* 10.0*  --    < > 11.9* 12.6   HEMATOCRIT % 35.1* 34.1* 30.6*  --    < > 35.3* 39.1   ALT U/L  --   --   --   --   --  24 26   AST U/L  --   --   --   --   --  36 36   SODIUM mmol/L  --  139 135* 130*   < > 132* 133*   POTASSIUM mmol/L  --  3.7 3.3* 3.7   < > 3.4* 3.3*   CHLORIDE mmol/L  --  102 101 98   < > 99 102   CREATININE mg/dL  --  0.62 0.57 0.44*   < > 0.65 0.79   BUN mg/dL  --  12 11 8   < > 13 14   CO2 mmol/L  --  27 23 24   < > 20* 20*   INR   --   --   --   --   --  0.9 1.0    < > = values in this interval not displayed.          CT internal auditory canals posterior fossa wo IV contrast  Narrative: Interpreted By:  Ritu Bishop and Hofer Lindsay   STUDY:  CT IAC WO IV CONTRAST;  11/2/2024 7:48 pm      INDICATION:  Signs/Symptoms:known ICH with new sxs.          COMPARISON:  CT head 11/02/2024, 10/30/2024; CT facial bones 10/29/2024; CT head  10/29/2024.      ACCESSION NUMBER(S):  [id number]      ORDERING CLINICIAN:  BYRON ESPINOZA      TECHNIQUE:  Noncontrast CT scan of the temporal bone was performed with  0.8 mm  slice thickness acquisition.  The images were reformatted in coronal  and axial planes.      FINDINGS:  Right Temporal Bone:      External auditory canal:  Normal  Middle ear: Middle ear cleft including the Prussak space is clear  Scutum and Tegmen Tympani:  Unremarkable  Oval and Round windows:  Unremarkable  Mastoid antrum & air cells: There may be partial opacification of a  few mastoid air cells. Ossicles: Unremarkable  Cochlea: Normal  Vestibular apparatus: Normal  Vestibular aqueduct: Normal  Facial nerve canal: Normal  Internal auditory canal: Normal  Carotid Canal:  Unremarkable  Jugular Bulb:  Unremarkable      Left Temporal Bone:      External auditory canal:  Normal  Middle ear: Middle ear cleft including the Prussak space is clear  Scutum and Tegmen Tympani:  Unremarkable  Oval and Round windows:  Unremarkable  Mastoid antrum & air cells: There may be partial opacification of a  few mastoid air cells. Ossicles: Unremarkable  Cochlea: Normal  Vestibular apparatus: Normal  Vestibular aqueduct: Normal  Facial nerve canal: Normal  Internal auditory canal: Normal  Carotid Canal:  Unremarkable  Jugular Bulb:  Unremarkable      Paranasal sinuses:  Mucosal thickening and polyps or retention cysts are noted within the  left maxillary, left frontal, and bilateral sphenoid sinuses. There  is also mucosal thickening in the right maxillary sinus and involving  several ethmoid air cells.      There are periapical lucencies associated with several maxillary  teeth for which correlation with a dental examination is recommended.      The patient's known hemorrhagic contusions and areas of extra-axial  hemorrhage are partially included on the obtained field-of-view and  inadequately evaluated on the basis of this exam.      There are degenerative changes of the temporomandibular  articulations, right greater than left.      There are multifocal vascular calcifications.      Impression: No definitive evidence of temporal bone fracture.      I  personally reviewed the images/study and resident's interpretation  and I agree with the findings as stated by Adamaris Rivera MD (resident  radiologist).      MACRO:  None      Signed by: Ritu Bishop 11/3/2024 6:04 AM  Dictation workstation:   [id number]          DATA:  EKG: QTC  Encounter Date: 10/28/24   ECG 12 lead   Result Value    Ventricular Rate 90    Atrial Rate 90    AL Interval 200    QRS Duration 94    QT Interval 402    QTC Calculation(Bazett) 491    P Axis 55    R Axis 92    T Axis 29    QRS Count 15    Q Onset 210    P Onset 110    P Offset 161    T Offset 411    QTC Fredericia 460    Narrative    Normal sinus rhythm  Rightward axis  Prolonged QT  Abnormal ECG  No previous ECGs available  See ED provider note for full interpretation and clinical correlation  Confirmed by Humble Velazquez ([id number]) on 10/29/2024 1:30:10 AM      Anti-psychotics in 48 hours: none   Opioids/Benzodiazepines in 48 hours: oxycodone   Anticholinergics on board:No  Restraints:No  Indwelling catheters:No  Last BM: patient states she is moving her bowels   UO in 24 hours: voiding without difficulty   Activity in the past 24 hours: walking to bathroom   Need for ambulatory devices: none at baseline           Assessment/Plan     Pia Nguyen is a 69 y.o. female with HTN, HLD, PAD s/p left fem and popliteal artery angioplasty 7/2023 on ASA and Plavix, asthma, COPD, hypothyroid, depression/anxiety who presented to [facility] after a fall while intoxicated down 6 steps. Patient found to have SAH, IPH near left silvian fissure, pneumocephalus, left subdural hematoma. CT face with concern for ethmoid sinus fracture. Patient being seen in geriatric consultation for recurrent falls.     1. Resolving acute mixed (hyperactive/hypoactive) delirium in the setting of a fall SAH/SDH, IPH near left silvian fissure and pneumocephalus as well as chronic ETOH use  - CAM negative, 4 AT: 2  - No concerning medications   - Continue melatonin 3 mg daily    - CTH stable  - Continue scheduled acetaminophen   - Maintain delirium precautions       2. L frontal SAH, thin L SDH, IPH near left silvian fissure, pneumoncephalus  - Repeat CTH on 11/2 stable  - Keprra x 7 days  - Goal SBP < 160  - SBP is fluctuating - at times > 160- continue carvediolol. If SBP remains elevated, consider adding amlodipine 5 mg daily   - Patient is to transfer to [facility] for TBI rehab     3.  Falls  -  reports 2 recent falls, unwitnessed and without injury. He states she doesn't use any assistive devices   -  notes some gait instability  - ETOH likely contributing to fall risk, recommend support to help with stopping  -  PT/OT        4M AGE-FRIENDLY INITIATIVE:  What matters most to patient: Caring for others   Medications: none concerning   Mentation: CAM negative, 4 AT: 2   Mobility: Uses no assistive devices at baseline     Geriatric medicine will continue to follow the patient. Thank you for allowing geriatric medicine to be involved in the care of your patient. Geriatric medicine consultation team is available during work hours Monday through Friday. For any emergency issues requiring immediate assistance over the weekend, please page Geriatrics pager [phone]    Kylee Briceño, APRN-CNP

## 2024-11-04 NOTE — DISCHARGE SUMMARY
Discharge Diagnosis  L frontal SAH  Thin L SDH  IPH near L silvian fissure  Pneumocephalus  Ethmoid sinus lucencies    Issues Requiring Follow-Up  SAH, SDH, TBI: follow up with Neurosurgery with repeat CT head    Hospital Course  Patient is a 69-year-old female who presents to [facility] transferred from outside hospital s/p fall downstairs, positive EtOH. Patient presented with GCS 14 -1 for confusion. Patient imaged and found to have a SAH, IPH, SDH.    Neurosurgery was consulted, recommended to repeat CT head 6 hours from previous. Patient underwent every hour neurochecks. Strict blood pressure control was implemented with goal less than 160 mmHg. Also recommended CT face to further evaluate for skull fracture given pneumocephalus. Recommending Keppra 500 mg twice daily x 7 days for seizure prophylaxis. Also recommended holding DVT chemoprophylaxis until 48 hours after stable CT head. Second repeat CT head was stable. Patient okay for every 4 hours neurochecks. Needs 2-week follow-up with neurosurgery with repeat CT head at that time. Recommended holding aspirin until post bleed day 7 and holding Plavix until follow-up appointment with neurosurgery.    Patient also found to have an ethmoid sinus fracture, plastics was consulted for additional recommendations and patient subsequently started on Unasyn. Recommended no acute surgical interventions and likely to heal independently with conservative management over time. Patient should maintain sinus precautions for 4 to 6 weeks postinjury. No scheduled Plastics follow up required, but patient may follow up on a PRN basis.    Patient with continued headache on 11/2 with new photophobia and worsened nausea. Repeat CTH showed possible small new SDH. Neurosurgery re-engaged for further recommendations. No clinical signs of CSF leak. CT IAC obtained, no evidence of CSF leak. Continue prophylactic Keppra x 7 days. To follow up outpatient with CT head.    Patient resumed on all  home medications as clinically indicated. Syncope workup performed with EKG, troponin, orthostatics. Geriatrics was consulted due to multiple falls at home. Physical medicine and rehab was also consulted for additional rehab recommendations. Recommended the patient would be a good candidate for inpatient rehabilitation and would likely benefit from a stay in an inpatient rehabilitation facility with a TBI focus.    Patient seen and evaluated by PT/OT, recommended high intensity level of continued care after discharge. Also recommended TBI focused rehab after discharge.    Pertinent Physical Exam At Time of Discharge  Physical Exam  Constitutional:       General: She is not in acute distress.  HENT:      Head: Normocephalic.      Mouth/Throat:      Pharynx: Oropharynx is clear.   Eyes:      Extraocular Movements: Extraocular movements intact.   Cardiovascular:      Rate and Rhythm: Normal rate.      Pulses: Normal pulses.   Pulmonary:      Effort: Pulmonary effort is normal. No respiratory distress.      Breath sounds: No stridor.   Abdominal:      General: There is no distension.      Palpations: Abdomen is soft.   Musculoskeletal:         General: No swelling.      Cervical back: No tenderness.   Skin:     General: Skin is warm and dry.      Capillary Refill: Capillary refill takes less than 2 seconds.      Findings: Bruising present.      Comments: Ecchymosis to R shoulder   Neurological:      Mental Status: She is alert and oriented to person, place, and time.      Sensory: No sensory deficit.      Motor: No weakness.      Comments:  strength 5/5 bilaterally, sensation to light touch grossly intact to bilateral upper and lower extremities.   Psychiatric:         Mood and Affect: Mood normal.       Home Medications     Medication List      START taking these medications     acetaminophen 325 mg tablet; Commonly known as: Tylenol; Take 2 tablets   (650 mg) by mouth 3 times daily (morning, midday, late  afternoon).   butalbital-acetaminophen-caff -40 mg tablet; Take 1 tablet by   mouth every 6 hours if needed for headaches or migraine.   cyanocobalamin 1,000 mcg tablet; Commonly known as: Vitamin B-12; Take 1   tablet (1,000 mcg) by mouth once daily.; Start taking on: November 5, 2024   folic acid 400 mcg tablet; Commonly known as: Folvite; Take 1 tablet   (0.4 mg) by mouth once daily.; Start taking on: November 5, 2024   melatonin 3 mg tablet; Take 1 tablet (3 mg) by mouth once daily at   bedtime.   oxyCODONE 5 mg immediate release tablet; Commonly known as: Roxicodone;   Take 1 tablet (5 mg) by mouth every 6 hours if needed for severe pain (7 -   10).   thiamine 100 mg tablet; Commonly known as: Vitamin B-1; Take 1 tablet   (100 mg) by mouth once daily.; Start taking on: November 5, 2024     CONTINUE taking these medications     albuterol 90 mcg/actuation inhaler   aspirin 81 mg EC tablet   azelastine 0.05 % ophthalmic solution; Commonly known as: Optivar   benzonatate 100 mg capsule; Commonly known as: Tessalon   calcium carbonate 200 mg calcium chewable tablet; Commonly known as:   Tums   carvedilol 6.25 mg tablet; Commonly known as: Coreg   Fish OiL 1,000 (120-180) mg capsule; Generic drug: omega 3-dha-epa-fish   oil   ibandronate 150 mg tablet; Commonly known as: Boniva   levothyroxine 100 mcg tablet; Commonly known as: Synthroid, Levoxyl   multivitamin tablet   omeprazole 20 mg DR capsule; Commonly known as: PriLOSEC   rosuvastatin 40 mg tablet; Commonly known as: Crestor   sertraline 50 mg tablet; Commonly known as: Zoloft     STOP taking these medications     clopidogrel 75 mg tablet; Commonly known as: Plavix       Outpatient Follow-Up  Future Appointments   Date Time Provider Department Center   11/14/2024 12:00 PM PRISCILLA CT 1 [facility]   11/15/2024 11:45 AM Jazz Garza MD [id number]   Patient may follow up with plastic surgery outpatient on PRN basis, no scheduled follow up  required for ethmoid sinus lucencies.    At the time of discharge, patient's pain was controlled, patient was urinating, tolerating oral intake. Based on PT/OT's recommendation, patient was discharged to [facility] TBI rehab in stable condition with scripts and follow up appointments as appropriate. Discharge plan was discussed with the patient/family and all questions were discussed and answered. Patient/family agreeable to plan. Patient discharged in stable condition.      Silvana Nixon PA-C

## 2024-11-14 ENCOUNTER — APPOINTMENT (OUTPATIENT)
Dept: RADIOLOGY | Facility: HOSPITAL | Age: 69
End: 2024-11-14
Payer: MEDICARE

## 2024-11-15 ENCOUNTER — APPOINTMENT (OUTPATIENT)
Dept: NEUROSURGERY | Facility: HOSPITAL | Age: 69
End: 2024-11-15
Payer: MEDICARE

## 2024-11-18 ENCOUNTER — APPOINTMENT (OUTPATIENT)
Dept: NEUROSURGERY | Facility: HOSPITAL | Age: 69
End: 2024-11-18
Payer: MEDICARE

## 2024-11-21 ENCOUNTER — HOSPITAL ENCOUNTER (OUTPATIENT)
Dept: RADIOLOGY | Facility: HOSPITAL | Age: 69
Discharge: HOME | End: 2024-11-21
Payer: MEDICARE

## 2024-11-21 DIAGNOSIS — I60.9 SAH (SUBARACHNOID HEMORRHAGE) (MULTI): ICD-10-CM

## 2024-11-21 PROCEDURE — 70450 CT HEAD/BRAIN W/O DYE: CPT

## 2024-11-21 PROCEDURE — 70450 CT HEAD/BRAIN W/O DYE: CPT | Performed by: RADIOLOGY

## 2024-11-25 ENCOUNTER — OFFICE VISIT (OUTPATIENT)
Dept: NEUROSURGERY | Facility: HOSPITAL | Age: 69
End: 2024-11-25
Payer: MEDICARE

## 2024-11-25 VITALS
OXYGEN SATURATION: 100 % | SYSTOLIC BLOOD PRESSURE: 117 MMHG | TEMPERATURE: 97.9 F | RESPIRATION RATE: 18 BRPM | WEIGHT: 149.3 LBS | DIASTOLIC BLOOD PRESSURE: 58 MMHG | HEART RATE: 58 BPM | BODY MASS INDEX: 24.1 KG/M2

## 2024-11-25 DIAGNOSIS — S06.351D: Primary | ICD-10-CM

## 2024-11-25 PROCEDURE — 1111F DSCHRG MED/CURRENT MED MERGE: CPT | Performed by: NEUROLOGICAL SURGERY

## 2024-11-25 PROCEDURE — 99214 OFFICE O/P EST MOD 30 MIN: CPT | Performed by: NEUROLOGICAL SURGERY

## 2024-11-25 PROCEDURE — 1126F AMNT PAIN NOTED NONE PRSNT: CPT | Performed by: NEUROLOGICAL SURGERY

## 2024-11-25 ASSESSMENT — PAIN SCALES - GENERAL: PAINLEVEL_OUTOF10: 0-NO PAIN

## 2024-11-25 ASSESSMENT — VISUAL ACUITY: VA_NORMAL: 1

## 2024-11-25 NOTE — PROGRESS NOTES
Pia Nguyen is a 69 y.o. year old female    FELIBERTO Palacio is a 69 y.o. female with h/o HTN, HLD, PAD s/p L fem and popliteal artery angioplasty 7/2023, on ASA/PLX, asthma, COPD, hypothyroidism, depression, p/w fall down stairs, +HS. CTH 1.1x1.4 L temporal contusion, thin L frontal and tentorial SDH, L sylvian and basilar cistern SAH, bifrontal scattered pneumocephalus, CTH blossoming contusion, incr temporal and LF SDH, thin R tentorial SDH, L parietal contusion, CT face trace LF pneumocephalus w/ ethomoid lucencies, 10/30 CTH stable, 11/2 severe HA, CTH stable blood, small LF hygroma, resolve pneumocephalus. 11/2 severe HA, CTH with stable blood, small LF hygroma, resolved pneumocephalus, CT IAC neg. She was eventually discharged with 2-3 week followup with repeat CTH.    She comes to clinic today accompanied by  and daughter. Patient denies fevers, headaches, nausea, vomiting, speech difficulty, seizures, double/blurry vision, sensory loss, weakness, incontinence, pain.           Review of Systems   All other systems reviewed and are negative.         No past medical history on file.    No past surgical history on file.        Current Outpatient Medications:     acetaminophen (Tylenol) 325 mg tablet, Take 2 tablets (650 mg) by mouth 3 times daily (morning, midday, late afternoon)., Disp: , Rfl:     albuterol 90 mcg/actuation inhaler, Inhale 2 puffs every 4 hours if needed., Disp: , Rfl:     aspirin 81 mg EC tablet, Take 1 tablet (81 mg) by mouth once daily., Disp: , Rfl:     azelastine (Optivar) 0.05 % ophthalmic solution, Administer 1 drop into both eyes once daily., Disp: , Rfl:     benzonatate (Tessalon) 100 mg capsule, Take 1 capsule (100 mg) by mouth 3 times a day as needed for cough., Disp: , Rfl:     butalbital-acetaminophen-caff -40 mg tablet, Take 1 tablet by mouth every 6 hours if needed for headaches or migraine., Disp: , Rfl:     calcium carbonate (Tums) 200 mg calcium chewable tablet,  Chew 1 tablet (500 mg) once daily as needed for indigestion or heartburn., Disp: , Rfl:     carvedilol (Coreg) 6.25 mg tablet, Take 1 tablet (6.25 mg) by mouth 2 times daily (morning and late afternoon)., Disp: , Rfl:     cyanocobalamin (Vitamin B-12) 1,000 mcg tablet, Take 1 tablet (1,000 mcg) by mouth once daily., Disp: , Rfl:     folic acid (Folvite) 400 mcg tablet, Take 1 tablet (0.4 mg) by mouth once daily., Disp: , Rfl:     ibandronate (Boniva) 150 mg tablet, Take 1 tablet (150 mg) by mouth every 30 (thirty) days., Disp: , Rfl:     levothyroxine (Synthroid, Levoxyl) 100 mcg tablet, Take 1 tablet (100 mcg) by mouth early in the morning.., Disp: , Rfl:     melatonin 3 mg tablet, Take 1 tablet (3 mg) by mouth once daily at bedtime., Disp: , Rfl:     multivitamin tablet, Take 1 tablet by mouth once daily., Disp: , Rfl:     omega 3-dha-epa-fish oil (Fish OiL) 1,000 (120-180) mg capsule, Take 1 capsule (1,000 mg) by mouth once daily., Disp: , Rfl:     omeprazole (PriLOSEC) 20 mg DR capsule, Take 2 capsules (40 mg) by mouth once daily in the morning., Disp: , Rfl:     oxyCODONE (Roxicodone) 5 mg immediate release tablet, Take 1 tablet (5 mg) by mouth every 6 hours if needed for severe pain (7 - 10)., Disp: , Rfl:     rosuvastatin (Crestor) 40 mg tablet, Take 1 tablet (40 mg) by mouth once daily in the morning. Take before meals., Disp: , Rfl:     sertraline (Zoloft) 50 mg tablet, Take 1.5 tablets (75 mg) by mouth once daily., Disp: , Rfl:     thiamine (Vitamin B-1) 100 mg tablet, Take 1 tablet (100 mg) by mouth once daily., Disp: , Rfl:         Objective   Vitals:    11/25/24 1002   BP: 117/58   Pulse: 58   Resp: 18   Temp: 36.6 °C (97.9 °F)   SpO2: 100%       Neurological Exam  Mental Status   Oriented to person, place, time and situation. Recent and remote memory are intact. Speech is normal. Language is fluent with no aphasia. Attention and concentration are normal.    Cranial Nerves  CN II: Visual acuity is  normal. Visual fields full to confrontation.  CN III, IV, VI: Extraocular movements intact bilaterally. Normal lids and orbits bilaterally. Pupils equal round and reactive to light bilaterally.  CN V: Facial sensation is normal.  CN VII: Full and symmetric facial movement.  CN VIII: Hearing is normal.  CN IX, X: Palate elevates symmetrically. Normal gag reflex.  CN XI: Shoulder shrug strength is normal.  CN XII: Tongue midline without atrophy or fasciculations.    Motor  Normal muscle bulk throughout. No pronator drift.    Sensory  Sensation is intact to light touch, pinprick, vibration and proprioception in all four extremities.    Reflexes  Deep tendon reflexes are 2+ and symmetric in all four extremities.    Coordination    Finger-to-nose, rapid alternating movements and heel-to-shin normal bilaterally without dysmetria.    Gait  Normal casual, toe, heel and tandem gait.        Relevant Results    Imaging Results: CT head wo IV contrast 11/21/2024    Narrative  Interpreted By:  Ritu Bishop,  STUDY:  CT HEAD WO IV CONTRAST;  11/21/2024 12:20 pm    INDICATION:  Signs/Symptoms:stability.    COMPARISON:  November 2, 2024    ACCESSION NUMBER(S):  [id number]    ORDERING CLINICIAN:  JASON BANKS    TECHNIQUE:  Noncontrast axial CT scan of head was performed. Angled reformats in  brain and bone windows were generated. The images were reviewed in  bone, brain, blood and soft tissue windows. Coronal and sagittal  reformats are provided for review.    FINDINGS:  There has been marked interval improvement in hyperdense  intraparenchymal and likely subarachnoid hemorrhage within and along  the left temporal lobe compared with the previous examination. There  is persistent but less pronounced diminished attenuation likely  related to contusion and edema. There has been improvement in  associated mass effect.    The predominantly hypodense extra-axial collection overlying the left  cerebral hemisphere has resolved.  Trace hyperdense subdural hematomas  along the tentorium and posterior falx are no longer identified with  certainty. Additional scattered subarachnoid hemorrhage and potential  intraventricular hemorrhage are also no longer identified with  certainty.    There is prominence of ventricles and sulci compatible with diffuse  parenchymal volume loss. There are nonspecific areas of diminished  attenuation in the subcortical and periventricular white matter.    Mucosal thickening and/or retention cysts or polyps are noted in the  left maxillary and bilateral sphenoid sinuses. There is minimal  mucosal thickening within the right maxillary sinus and scattered  ethmoid air cells.. The mastoid air cells are clear.    Impression  1. There has been significant interval improvement from November 2, 2024. Previously demonstrated areas of hyperdense intraparenchymal,  subarachnoid, subdural, and intraventricular hemorrhage have  resolved. There has been interval improvement in mass effect. There  is persistent abnormal diminished attenuation in the left temporal  lobe which may be related to contusion and/or edema. No new acute  intracranial hemorrhage is identified.  2. Nonspecific white matter changes most likely represent  small-vessel ischemic disease in a patient of this age.      MACRO:  None    Signed by: Ritu Bishop 11/21/2024 12:46 PM  Dictation workstation:   [id number]      CT head wo IV contrast 11/02/2024    Narrative  Interpreted By:  Jonathan Rebolledo,  STUDY:  CT HEAD WO IV CONTRAST;  11/2/2024 10:02 am    INDICATION:  Signs/Symptoms:New headache with recent ICH.    COMPARISON:  10/30/2024    ACCESSION NUMBER(S):  [id number]    ORDERING CLINICIAN:  JASON BANKS    TECHNIQUE:  Axial CT images of the head were obtained without intravenous  contrast administration.    FINDINGS:  The CT of the head again demonstrates areas of hyperdense  intraparenchymal hemorrhage centered within left temporal  lobe  similar in appearance when compared with the prior study dated  10/30/2024. There is persistent surrounding edema and mass effect  contributing to asymmetric effacement of surrounding sulci and  partial effacement of the adjacent left lateral ventricle.    There is again evidence of similar extra-axial hyperdense hemorrhage  most pronounced surrounding the left temporal lobe which may be  subarachnoid and/or subdural in location. There is again evidence of  mildly prominent hyperdensity along the tentorium left greater than  right suggestive of subdural hemorrhage. There has been mild interval  enlargement of the extra-axial space low in attenuation similar to  CSF adjacent to the left frontal lobe when compared with 10/30/2024  raising the possibility of an additional small low-density subdural  collection versus mild interval prominence of the subarachnoid space.    There is residual but improved hyperdense subarachnoid hemorrhage  noted along sulci of the cerebral hemispheres when compared with the  prior study.    There has been interval improvement in the previously noted  hyperdense intraventricular hemorrhage layering within the lateral  ventricles when compared with the prior study.    The ventricular system remains nondilated without evidence of  hydrocephalus.    There is no significant midline shift.    Atherosclerotic calcifications are noted along the carotid siphons.    There is persistent opacification and partial opacification of  scattered ethmoid air cells. There is lobulated mucosal thickening  and/or retention cysts/polyps within the maxillary sinuses and  sphenoid sinus.    There is persistent but improved extracranial soft tissue swelling  noted within the right scalp and face likely posttraumatic in  etiology.    Impression  The CT of the head again demonstrates areas of hyperdense  intraparenchymal hemorrhage centered within left temporal lobe  similar in appearance when compared with  the prior study dated  10/30/2024. There is persistent surrounding edema and mass effect  contributing to asymmetric effacement of surrounding sulci and  partial effacement of the adjacent left lateral ventricle.    There is again evidence of similar extra-axial hyperdense hemorrhage  most pronounced surrounding the left temporal lobe which may be  subarachnoid and/or subdural in location. There is again evidence of  mildly prominent hyperdensity along the tentorium left greater than  right suggestive of subdural hemorrhage. There has been mild interval  enlargement of the extra-axial space low in attenuation similar to  CSF adjacent to the left frontal lobe when compared with 10/30/2024  raising the possibility of an additional small low-density subdural  collection versus mild interval prominence of the subarachnoid space.    There is residual but improved hyperdense subarachnoid hemorrhage  noted along sulci of the cerebral hemispheres when compared with the  prior study.    There has been interval improvement in the previously noted  hyperdense intraventricular hemorrhage layering within the lateral  ventricles when compared with the prior study.    The ventricular system remains nondilated without evidence of  hydrocephalus.    MACRO:  None.    Signed by: Jonathan Rebolledo 11/2/2024 10:14 AM  Dictation workstation:   [id number]    Problem List Items Addressed This Visit    None  Visit Diagnoses       Traumatic left-sided intracerebral hemorrhage with loss of consciousness of 30 minutes or less, subsequent encounter    -  Primary    Relevant Orders    Referral to Neurology               Assessment/Plan   Diagnoses and all orders for this visit:  Traumatic left-sided intracerebral hemorrhage with loss of consciousness of 30 minutes or less, subsequent encounter  -     Referral to Neurology; Future  Patient doing well with complete resolution of intracerebral hemorrhage on recent imaging, no new symptoms.    Neurology  referral for kera management.    Neurosurgery followup prn, no further followup needed.    Will defer care to PCP and Geriatrician.

## 2024-12-09 ENCOUNTER — APPOINTMENT (OUTPATIENT)
Dept: NEUROLOGY | Facility: CLINIC | Age: 69
End: 2024-12-09
Payer: MEDICARE

## 2024-12-09 VITALS
HEART RATE: 63 BPM | DIASTOLIC BLOOD PRESSURE: 68 MMHG | HEIGHT: 66 IN | SYSTOLIC BLOOD PRESSURE: 102 MMHG | BODY MASS INDEX: 23.3 KG/M2 | WEIGHT: 145 LBS

## 2024-12-09 DIAGNOSIS — S06.351D: ICD-10-CM

## 2024-12-09 DIAGNOSIS — G31.84 MCI (MILD COGNITIVE IMPAIRMENT) WITH MEMORY LOSS: Primary | ICD-10-CM

## 2024-12-09 PROBLEM — S06.351A: Status: ACTIVE | Noted: 2024-12-09

## 2024-12-09 PROCEDURE — 99205 OFFICE O/P NEW HI 60 MIN: CPT | Performed by: PSYCHIATRY & NEUROLOGY

## 2024-12-09 PROCEDURE — 1036F TOBACCO NON-USER: CPT | Performed by: PSYCHIATRY & NEUROLOGY

## 2024-12-09 PROCEDURE — 1159F MED LIST DOCD IN RCRD: CPT | Performed by: PSYCHIATRY & NEUROLOGY

## 2024-12-09 PROCEDURE — 3008F BODY MASS INDEX DOCD: CPT | Performed by: PSYCHIATRY & NEUROLOGY

## 2024-12-09 RX ORDER — ATORVASTATIN CALCIUM 80 MG/1
80 TABLET, FILM COATED ORAL DAILY
COMMUNITY
Start: 2024-11-15 | End: 2025-11-15

## 2024-12-09 RX ORDER — TAMSULOSIN HYDROCHLORIDE 0.4 MG/1
0.4 CAPSULE ORAL
COMMUNITY
Start: 2024-11-15 | End: 2025-01-14

## 2024-12-09 RX ORDER — TOPIRAMATE 50 MG/1
50 TABLET, FILM COATED ORAL 2 TIMES DAILY
COMMUNITY
Start: 2024-11-15

## 2024-12-09 RX ORDER — BUDESONIDE, GLYCOPYRROLATE, AND FORMOTEROL FUMARATE 160; 9; 4.8 UG/1; UG/1; UG/1
2 AEROSOL, METERED RESPIRATORY (INHALATION) 2 TIMES DAILY
COMMUNITY
Start: 2024-08-26 | End: 2025-08-26

## 2024-12-09 NOTE — PATIENT INSTRUCTIONS
You have gradually improved in your cognition and memory.  I expect further improvement, particularly with PT/OT and ST.  I do not think you had a true seizure, so I recommend tapering off the topiramate: 50 mg daily for 2 weeks, then stop it.  I do recommend resuming clopidogrel with the baby aspirin as long as her balance is normal.  As far as driving, I recommend making an appointment for a safety driving evaluation.  Will give you the results of the EEG- I recommend having that test after you stop the topiramate.  Follow up is left open ended.  Reduce any alcohol intake to no more than one alcoholic drink per day.

## 2024-12-09 NOTE — PROGRESS NOTES
Subjective   Pia Nguyen is a 69 y.o.   female.  HPI  This is a 69 year old female accompanied by her spouse and daughter.  The patient is a limited historian.  This is a Neurology follow up from previously mechanical fall down several stairs, going up from the basement, and struck her head on the concrete floor.  This was an unwitnessed fall, alcohol in her system- possibly she consumed two glasses of wine- her blood alcohol level was 236 on presenting to the [facility] ED, on 10/28/24.  She was diagnosed with a SAH, IPH, and SDH, all presumed post-traumatic.  Her CT scans indicated nondisplaced facial bone fractures in the region of the eyebrows, and there was a IPH located within the left temporal lobe, and bilateral scattered pneumocephalus.  There was also a left parietal contusion.  The patient was on both a baby aspirin and clopidogrel 75 mg daily for a history of bilateral leg stents.  The patient was transferred from  ED to [facility], and had close neurosurgical monitoring with follow up head CT scans.  She was discharged to [facility] on 11/4/24.  She had been started on Keppra, no history of prior seizures, and the evening of the fall, she may have had convulsive syncope.  No EEG done.  She is now at home, her spouse is her primary caregiver.  She denies any HA.  She has had improving antegrade memory- still cannot recall any elements of her fall on 10/28.  Her alcohol consumption was reviewed: about 2 drinks per night (glasses of wine).  She previously worked as an - can no longer perform this work.  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   She would like to be taken off the Keppra, but has no specific complaints about it.  She sleeps well, balance is good.  She does not drive- not cleared for it.  Objective   Neurological Exam  Mental Status  Awake. Oriented only to place and time. Recalls 1 of 3 objects immediately. Speech is normal.  Able to name objects and repeat. Able to spell words backwards.    Cranial Nerves  CN II: Visual fields full to confrontation.  CN III, IV, VI: Extraocular movements intact bilaterally.  CN VII: Full and symmetric facial movement.  CN IX, X: Palate elevates symmetrically  CN XII: Tongue midline without atrophy or fasciculations.    Motor   Strength is 5/5 throughout all four extremities.    Sensory  Light touch is normal in upper and lower extremities. Proprioception is normal in upper and lower extremities.     Reflexes                                            Right                      Left  Biceps                                 1+                         1+  Patellar                                0                         0  Right Plantar: downgoing  Left Plantar: downgoing    Coordination  Right: Finger-to-nose normal.Left: Finger-to-nose normal.    Gait  Casual gait is normal including stance, stride, and arm swing. Tandem gait abnormality: Romberg is absent.  Cannot perform tandem walking.      Physical Exam  Constitutional:       General: She is awake.   Eyes:      Extraocular Movements: Extraocular movements intact.   Neurological:      Motor: Motor strength is normal.     Coordination: Romberg sign negative.      Deep Tendon Reflexes:      Reflex Scores:       Bicep reflexes are 1+ on the right side and 1+ on the left side.       Patellar reflexes are 0 on the right side and 0 on the left side.  Psychiatric:         Speech: Speech normal.       I personally reviewed laboratory, radiographic, and medical studies which were pertinent for nothing.    Assessment/Plan

## 2024-12-10 ENCOUNTER — TELEPHONE (OUTPATIENT)
Dept: NEUROLOGY | Facility: CLINIC | Age: 69
End: 2024-12-10

## 2024-12-10 DIAGNOSIS — R55 SYNCOPE, UNSPECIFIED SYNCOPE TYPE: Primary | ICD-10-CM

## 2025-01-08 ENCOUNTER — APPOINTMENT (OUTPATIENT)
Dept: NEUROLOGY | Facility: HOSPITAL | Age: 70
End: 2025-01-08
Payer: MEDICARE

## 2025-01-20 ENCOUNTER — HOSPITAL ENCOUNTER (OUTPATIENT)
Dept: NEUROLOGY | Facility: HOSPITAL | Age: 70
Discharge: HOME | End: 2025-01-20
Payer: MEDICARE

## 2025-01-20 DIAGNOSIS — S06.351D: ICD-10-CM

## 2025-01-20 PROCEDURE — 95812 EEG 41-60 MINUTES: CPT

## 2025-01-20 PROCEDURE — 95812 EEG 41-60 MINUTES: CPT | Performed by: STUDENT IN AN ORGANIZED HEALTH CARE EDUCATION/TRAINING PROGRAM

## 2025-01-20 NOTE — RESULT ENCOUNTER NOTE
Please tell the patient that her EEG was abnormal- no seizure activity, but there is slow wave activity over the left side, corresponding to her traumatic bleed from the fall.  I do not think that changes anything- she should continue tapering off the seizure medication.

## 2025-01-21 ENCOUNTER — TELEPHONE (OUTPATIENT)
Dept: NEUROLOGY | Facility: CLINIC | Age: 70
End: 2025-01-21
Payer: MEDICARE

## 2025-01-21 NOTE — TELEPHONE ENCOUNTER
"Pt lm stating she had surgery to test her brain and how her head was doing. She states \"I got the thing in the mail. What is next. I want to know when I can go home and when I'm allowed to drive, please have dr cline call me\" number is [phone]  "

## 2025-01-22 NOTE — TELEPHONE ENCOUNTER
"Patient left multiple confusing messages/ spoke with office staff yesterday stating she wanted to go home she wanted to drive and she wanted results of her \"brain surgery checking her brain good\". Pt seems to have lots of confusion.  "

## 2025-01-27 ENCOUNTER — APPOINTMENT (OUTPATIENT)
Dept: NEUROLOGY | Facility: CLINIC | Age: 70
End: 2025-01-27
Payer: MEDICARE

## 2025-01-27 VITALS
WEIGHT: 145 LBS | HEART RATE: 60 BPM | BODY MASS INDEX: 23.3 KG/M2 | SYSTOLIC BLOOD PRESSURE: 102 MMHG | HEIGHT: 66 IN | DIASTOLIC BLOOD PRESSURE: 52 MMHG

## 2025-01-27 DIAGNOSIS — I69.30 HISTORY OF STROKE WITH RESIDUAL DEFICIT: Primary | ICD-10-CM

## 2025-01-27 PROCEDURE — 1036F TOBACCO NON-USER: CPT | Performed by: PSYCHIATRY & NEUROLOGY

## 2025-01-27 PROCEDURE — 1159F MED LIST DOCD IN RCRD: CPT | Performed by: PSYCHIATRY & NEUROLOGY

## 2025-01-27 PROCEDURE — 99214 OFFICE O/P EST MOD 30 MIN: CPT | Performed by: PSYCHIATRY & NEUROLOGY

## 2025-01-27 PROCEDURE — 3008F BODY MASS INDEX DOCD: CPT | Performed by: PSYCHIATRY & NEUROLOGY

## 2025-01-27 NOTE — PROGRESS NOTES
Pia Nguyen is a 69 y.o. year old female presenting for EEG results     HPI:  Patient presents to the clinic for follow up EEG Results and mild cognitive impairment.  Last seen in the office 12/9/24.  Patient states she is doing well.  EEG results show no seizure activity but slow wave activity over the left side corresponding to the traumatic bleed from the patient's fall.  Patient is currently off her seizure medication (Topiramate).  Patient finished up with her PT and OT due to her improvements.  Patient has been able to go back to working with the [employer] with the help of her 's driving.  Patient's daughter believes the patient is improving but has some episodes of sun-downing where she is more confused during the evening than in the mornings.    ROS:   All pertinent positive symptoms are included in history of present illness.    All other systems have been reviewed and are negative and noncontributory to this patient's current ailments.    Current Outpatient Medications   Medication Sig Dispense Refill    acetaminophen (Tylenol) 325 mg tablet Take 2 tablets (650 mg) by mouth 3 times daily (morning, midday, late afternoon).      albuterol 90 mcg/actuation inhaler Inhale 2 puffs every 4 hours if needed.      aspirin 81 mg EC tablet Take 1 tablet (81 mg) by mouth once daily.      atorvastatin (Lipitor) 80 mg tablet Take 1 tablet (80 mg) by mouth once daily.      azelastine (Optivar) 0.05 % ophthalmic solution Administer 1 drop into both eyes once daily.      benzonatate (Tessalon) 100 mg capsule Take 1 capsule (100 mg) by mouth 3 times a day as needed for cough.      Breztri Aerosphere 160-9-4.8 mcg/actuation HFA aerosol inhaler Inhale 2 puffs twice a day.      calcium carbonate (Tums) 200 mg calcium chewable tablet Chew 1 tablet (500 mg) once daily as needed for indigestion or heartburn.      cyanocobalamin (Vitamin B-12) 1,000 mcg tablet Take 1 tablet (1,000 mcg) by mouth once daily.      ibandronate  "(Boniva) 150 mg tablet Take 1 tablet (150 mg) by mouth every 30 (thirty) days.      levothyroxine (Synthroid, Levoxyl) 100 mcg tablet Take 1 tablet (100 mcg) by mouth early in the morning..      melatonin 3 mg tablet Take 1 tablet (3 mg) by mouth once daily at bedtime.      multivitamin tablet Take 1 tablet by mouth once daily.      omega 3-dha-epa-fish oil (Fish OiL) 1,000 (120-180) mg capsule Take 1 capsule (1,000 mg) by mouth once daily.      omeprazole (PriLOSEC) 20 mg DR capsule Take 2 capsules (40 mg) by mouth once daily in the morning.      sertraline (Zoloft) 50 mg tablet Take 1.5 tablets (75 mg) by mouth once daily.      thiamine (Vitamin B-1) 100 mg tablet Take 1 tablet (100 mg) by mouth once daily.      carvedilol (Coreg) 6.25 mg tablet Take 1 tablet (6.25 mg) by mouth 2 times daily (morning and late afternoon).      folic acid (Folvite) 400 mcg tablet Take 1 tablet (0.4 mg) by mouth once daily.       No current facility-administered medications for this visit.       OBJECTIVE  Visit Vitals  /52 (BP Location: Left arm, Patient Position: Sitting, BP Cuff Size: Adult)   Pulse 60   Ht 1.676 m (5' 6\")   Wt 65.8 kg (145 lb)   BMI 23.40 kg/m²   Smoking Status Former   BSA 1.75 m²        Neurological Exam  Mental Status  Awake, alert and oriented to person, place and time. Oriented to person, place and time. Recalls 2 of 3 objects immediately. Speech is normal. Language is fluent with no aphasia. Attention and concentration are normal.    Physical Exam  Psychiatric:         Speech: Speech normal.       I personally reviewed laboratory, radiographic, and medical studies which were pertinent for EEG results showing no seizure activity but slow wave activity over the left side corresponding to the traumatic bleed from the patient's fall.      Assessment/Plan       Pia Nguyen is a 68yo female presenting for EEG results and follow up on cognitive impairment.  The EEG results show no seizure activity, only some " slowing of the waves on the left side.  You have gradually improved in your cognition and memory.  We will continue to stay off the Topiramate due to no seizure activity.  Recommend making an appointment for a safety driving evaluation.      Victor Manuel Segovia, SANDRA IV  [facility]

## 2025-01-29 ENCOUNTER — TELEPHONE (OUTPATIENT)
Dept: NEUROLOGY | Facility: CLINIC | Age: 70
End: 2025-01-29
Payer: MEDICARE

## 2025-01-29 DIAGNOSIS — S06.359D: Primary | ICD-10-CM

## 2025-01-29 NOTE — TELEPHONE ENCOUNTER
Patient needs a order stating she needs a safety driving evaluation in order for it to be completed.   I have attached [facility] number for outpatient therapy and their fax that they need the order sent to.

## 2025-06-09 NOTE — HOSPITAL COURSE
Patient is a 69-year-old female who presents to [facility] transferred from outside hospital s/p fall downstairs, positive EtOH. Patient presented with GCS 14 -1 for confusion. Patient imaged and found to have a SAH, IPH, SDH.    Neurosurgery was consulted, recommended to repeat CT head 6 hours from previous. Patient underwent every hour neurochecks. Strict blood pressure control was implemented with goal less than 160 mmHg. Also recommended CT face to further evaluate for skull fracture given pneumocephalus. Recommending Keppra 500 mg twice daily x 7 days for seizure prophylaxis. Also recommended holding DVT chemoprophylaxis until 48 hours after stable CT head. Second repeat CT head was stable. Patient okay for every 4 hours neurochecks. Needs 2-week follow-up with neurosurgery with repeat CT head at that time. Recommended holding aspirin until post bleed day 7 and holding Plavix until follow-up appointment with neurosurgery.    Patient also found to have an ethmoid sinus fracture, plastics was consulted for additional recommendations and patient subsequently started on Unasyn. Recommended no acute surgical interventions and likely to heal independently with conservative management over time. Patient should maintain sinus precautions for 4 to 6 weeks postinjury. No scheduled Plastics follow up required, but patient may follow up on a PRN basis.    Patient with continued headache on 11/2 with new photophobia and worsened nausea. Repeat CTH showed possible small new SDH. Neurosurgery re-engaged for further recommendations. No clinical signs of CSF leak. CT IAC obtained, no evidence of CSF leak. Continue prophylactic Keppra x 7 days. To follow up outpatient with CT head.    Patient resumed on all home medications as clinically indicated. Syncope workup performed with EKG, troponin, orthostatics. Geriatrics was consulted due to multiple falls at home. Physical medicine and rehab was also consulted for additional rehab  recommendations. Recommended the patient would be a good candidate for inpatient rehabilitation and would likely benefit from a stay in an inpatient rehabilitation facility with a TBI focus.    Patient seen and evaluated by PT/OT, recommended high intensity level of continued care after discharge. Also recommended TBI focused rehab after discharge.   Home

## 2025-08-19 ENCOUNTER — TELEPHONE (OUTPATIENT)
Dept: NEUROLOGY | Facility: CLINIC | Age: 70
End: 2025-08-19
Payer: MEDICARE